# Patient Record
Sex: FEMALE | Race: WHITE | NOT HISPANIC OR LATINO | Employment: OTHER | URBAN - METROPOLITAN AREA
[De-identification: names, ages, dates, MRNs, and addresses within clinical notes are randomized per-mention and may not be internally consistent; named-entity substitution may affect disease eponyms.]

---

## 2017-11-27 ENCOUNTER — RECORDS - HEALTHEAST (OUTPATIENT)
Dept: LAB | Facility: CLINIC | Age: 72
End: 2017-11-27

## 2017-11-27 LAB
CHOLEST SERPL-MCNC: 190 MG/DL
FASTING STATUS PATIENT QL REPORTED: ABNORMAL
HDLC SERPL-MCNC: 46 MG/DL
LDLC SERPL CALC-MCNC: 104 MG/DL
TRIGL SERPL-MCNC: 201 MG/DL

## 2019-02-27 ENCOUNTER — RECORDS - HEALTHEAST (OUTPATIENT)
Dept: LAB | Facility: CLINIC | Age: 74
End: 2019-02-27

## 2019-02-27 LAB
ALBUMIN SERPL-MCNC: 3.7 G/DL (ref 3.5–5)
ALP SERPL-CCNC: 72 U/L (ref 45–120)
ALT SERPL W P-5'-P-CCNC: 34 U/L (ref 0–45)
ANION GAP SERPL CALCULATED.3IONS-SCNC: 8 MMOL/L (ref 5–18)
AST SERPL W P-5'-P-CCNC: 43 U/L (ref 0–40)
BILIRUB SERPL-MCNC: 0.3 MG/DL (ref 0–1)
BUN SERPL-MCNC: 13 MG/DL (ref 8–28)
CALCIUM SERPL-MCNC: 9.6 MG/DL (ref 8.5–10.5)
CHLORIDE BLD-SCNC: 101 MMOL/L (ref 98–107)
CHOLEST SERPL-MCNC: 146 MG/DL
CO2 SERPL-SCNC: 26 MMOL/L (ref 22–31)
CREAT SERPL-MCNC: 0.88 MG/DL (ref 0.6–1.1)
FASTING STATUS PATIENT QL REPORTED: ABNORMAL
GFR SERPL CREATININE-BSD FRML MDRD: >60 ML/MIN/1.73M2
GLUCOSE BLD-MCNC: 171 MG/DL (ref 70–125)
HDLC SERPL-MCNC: 42 MG/DL
LDLC SERPL CALC-MCNC: 89 MG/DL
POTASSIUM BLD-SCNC: 4.2 MMOL/L (ref 3.5–5)
PROT SERPL-MCNC: 6.6 G/DL (ref 6–8)
SODIUM SERPL-SCNC: 135 MMOL/L (ref 136–145)
TRIGL SERPL-MCNC: 76 MG/DL

## 2020-03-02 ENCOUNTER — RECORDS - HEALTHEAST (OUTPATIENT)
Dept: LAB | Facility: CLINIC | Age: 75
End: 2020-03-02

## 2020-03-02 LAB
ALBUMIN SERPL-MCNC: 4 G/DL (ref 3.5–5)
ALP SERPL-CCNC: 98 U/L (ref 45–120)
ALT SERPL W P-5'-P-CCNC: 19 U/L (ref 0–45)
ANION GAP SERPL CALCULATED.3IONS-SCNC: 7 MMOL/L (ref 5–18)
AST SERPL W P-5'-P-CCNC: 20 U/L (ref 0–40)
BILIRUB SERPL-MCNC: 0.3 MG/DL (ref 0–1)
BUN SERPL-MCNC: 14 MG/DL (ref 8–28)
CALCIUM SERPL-MCNC: 10.6 MG/DL (ref 8.5–10.5)
CHLORIDE BLD-SCNC: 103 MMOL/L (ref 98–107)
CHOLEST SERPL-MCNC: 197 MG/DL
CO2 SERPL-SCNC: 27 MMOL/L (ref 22–31)
CREAT SERPL-MCNC: 0.82 MG/DL (ref 0.6–1.1)
FASTING STATUS PATIENT QL REPORTED: ABNORMAL
GFR SERPL CREATININE-BSD FRML MDRD: >60 ML/MIN/1.73M2
GLUCOSE BLD-MCNC: 89 MG/DL (ref 70–125)
HDLC SERPL-MCNC: 51 MG/DL
LDLC SERPL CALC-MCNC: 97 MG/DL
POTASSIUM BLD-SCNC: 4.9 MMOL/L (ref 3.5–5)
PROT SERPL-MCNC: 7.1 G/DL (ref 6–8)
SODIUM SERPL-SCNC: 137 MMOL/L (ref 136–145)
TRIGL SERPL-MCNC: 245 MG/DL

## 2021-04-16 ENCOUNTER — RECORDS - HEALTHEAST (OUTPATIENT)
Dept: LAB | Facility: CLINIC | Age: 76
End: 2021-04-16

## 2021-04-16 LAB
CHOLEST SERPL-MCNC: 197 MG/DL
FASTING STATUS PATIENT QL REPORTED: ABNORMAL
HDLC SERPL-MCNC: 48 MG/DL
LDLC SERPL CALC-MCNC: 118 MG/DL
TRIGL SERPL-MCNC: 157 MG/DL

## 2021-09-21 ENCOUNTER — LAB REQUISITION (OUTPATIENT)
Dept: LAB | Facility: CLINIC | Age: 76
End: 2021-09-21
Payer: COMMERCIAL

## 2021-09-21 DIAGNOSIS — E11.9 TYPE 2 DIABETES MELLITUS WITHOUT COMPLICATIONS (H): ICD-10-CM

## 2021-09-21 DIAGNOSIS — I10 ESSENTIAL (PRIMARY) HYPERTENSION: ICD-10-CM

## 2021-09-21 DIAGNOSIS — R60.0 LOCALIZED EDEMA: ICD-10-CM

## 2021-09-21 LAB
ALBUMIN SERPL-MCNC: 4.2 G/DL (ref 3.5–5)
ALP SERPL-CCNC: 81 U/L (ref 45–120)
ALT SERPL W P-5'-P-CCNC: 19 U/L (ref 0–45)
ANION GAP SERPL CALCULATED.3IONS-SCNC: 10 MMOL/L (ref 5–18)
AST SERPL W P-5'-P-CCNC: 21 U/L (ref 0–40)
BILIRUB SERPL-MCNC: 0.4 MG/DL (ref 0–1)
BUN SERPL-MCNC: 15 MG/DL (ref 8–28)
CALCIUM SERPL-MCNC: 10.9 MG/DL (ref 8.5–10.5)
CHLORIDE BLD-SCNC: 102 MMOL/L (ref 98–107)
CO2 SERPL-SCNC: 27 MMOL/L (ref 22–31)
CREAT SERPL-MCNC: 0.84 MG/DL (ref 0.6–1.1)
CREAT UR-MCNC: 70 MG/DL
GFR SERPL CREATININE-BSD FRML MDRD: 68 ML/MIN/1.73M2
GLUCOSE BLD-MCNC: 84 MG/DL (ref 70–125)
MICROALBUMIN UR-MCNC: 13.72 MG/DL (ref 0–1.99)
MICROALBUMIN/CREAT UR: 196 MG/G CR
POTASSIUM BLD-SCNC: 4.4 MMOL/L (ref 3.5–5)
PROT SERPL-MCNC: 7.3 G/DL (ref 6–8)
SODIUM SERPL-SCNC: 139 MMOL/L (ref 136–145)
TSH SERPL DL<=0.005 MIU/L-ACNC: 0.96 UIU/ML (ref 0.3–5)

## 2021-09-21 PROCEDURE — 80053 COMPREHEN METABOLIC PANEL: CPT | Mod: ORL | Performed by: FAMILY MEDICINE

## 2021-09-21 PROCEDURE — 82043 UR ALBUMIN QUANTITATIVE: CPT | Mod: ORL | Performed by: FAMILY MEDICINE

## 2021-09-21 PROCEDURE — 84443 ASSAY THYROID STIM HORMONE: CPT | Mod: ORL | Performed by: FAMILY MEDICINE

## 2022-07-25 ENCOUNTER — LAB REQUISITION (OUTPATIENT)
Dept: LAB | Facility: CLINIC | Age: 77
End: 2022-07-25
Payer: COMMERCIAL

## 2022-07-25 DIAGNOSIS — R30.0 DYSURIA: ICD-10-CM

## 2022-07-25 PROCEDURE — 87086 URINE CULTURE/COLONY COUNT: CPT | Mod: ORL | Performed by: FAMILY MEDICINE

## 2022-07-27 LAB — BACTERIA UR CULT: NORMAL

## 2022-08-04 ENCOUNTER — LAB REQUISITION (OUTPATIENT)
Dept: LAB | Facility: CLINIC | Age: 77
End: 2022-08-04
Payer: COMMERCIAL

## 2022-08-04 DIAGNOSIS — N39.0 URINARY TRACT INFECTION, SITE NOT SPECIFIED: ICD-10-CM

## 2022-08-04 PROCEDURE — 87086 URINE CULTURE/COLONY COUNT: CPT | Mod: ORL | Performed by: FAMILY MEDICINE

## 2022-08-06 LAB — BACTERIA UR CULT: NORMAL

## 2022-11-04 ENCOUNTER — LAB REQUISITION (OUTPATIENT)
Dept: LAB | Facility: CLINIC | Age: 77
End: 2022-11-04
Payer: COMMERCIAL

## 2022-11-04 DIAGNOSIS — E78.5 HYPERLIPIDEMIA, UNSPECIFIED: ICD-10-CM

## 2022-11-04 DIAGNOSIS — S06.0X0A CONCUSSION WITHOUT LOSS OF CONSCIOUSNESS, INITIAL ENCOUNTER: ICD-10-CM

## 2022-11-04 DIAGNOSIS — I10 ESSENTIAL (PRIMARY) HYPERTENSION: ICD-10-CM

## 2022-11-04 DIAGNOSIS — E11.9 TYPE 2 DIABETES MELLITUS WITHOUT COMPLICATIONS (H): ICD-10-CM

## 2022-11-04 LAB
ALBUMIN SERPL BCG-MCNC: 4.6 G/DL (ref 3.5–5.2)
ALP SERPL-CCNC: 81 U/L (ref 35–104)
ALT SERPL W P-5'-P-CCNC: 23 U/L (ref 10–35)
ANION GAP SERPL CALCULATED.3IONS-SCNC: 14 MMOL/L (ref 7–15)
AST SERPL W P-5'-P-CCNC: 24 U/L (ref 10–35)
BILIRUB SERPL-MCNC: 0.3 MG/DL
BUN SERPL-MCNC: 13.3 MG/DL (ref 8–23)
CALCIUM SERPL-MCNC: 10.5 MG/DL (ref 8.8–10.2)
CHLORIDE SERPL-SCNC: 101 MMOL/L (ref 98–107)
CHOLEST SERPL-MCNC: 159 MG/DL
CREAT SERPL-MCNC: 0.87 MG/DL (ref 0.51–0.95)
CREAT UR-MCNC: 118 MG/DL
CREAT UR-MCNC: <1.1 MG/DL
DEPRECATED HCO3 PLAS-SCNC: 23 MMOL/L (ref 22–29)
GFR SERPL CREATININE-BSD FRML MDRD: 69 ML/MIN/1.73M2
GLUCOSE SERPL-MCNC: 114 MG/DL (ref 70–99)
HDLC SERPL-MCNC: 38 MG/DL
LDLC SERPL CALC-MCNC: 97 MG/DL
MICROALBUMIN UR-MCNC: 227 MG/L
MICROALBUMIN UR-MCNC: 27 MG/L
MICROALBUMIN/CREAT UR: 192.37 MG/G CR (ref 0–25)
MICROALBUMIN/CREAT UR: NORMAL MG/G{CREAT}
NONHDLC SERPL-MCNC: 121 MG/DL
POTASSIUM SERPL-SCNC: 4.4 MMOL/L (ref 3.4–5.3)
PROT SERPL-MCNC: 7.1 G/DL (ref 6.4–8.3)
SODIUM SERPL-SCNC: 138 MMOL/L (ref 136–145)
TRIGL SERPL-MCNC: 122 MG/DL

## 2022-11-04 PROCEDURE — 80061 LIPID PANEL: CPT | Mod: ORL | Performed by: FAMILY MEDICINE

## 2022-11-04 PROCEDURE — 82043 UR ALBUMIN QUANTITATIVE: CPT | Mod: ORL | Performed by: FAMILY MEDICINE

## 2022-11-04 PROCEDURE — 80053 COMPREHEN METABOLIC PANEL: CPT | Mod: ORL | Performed by: FAMILY MEDICINE

## 2022-12-20 ENCOUNTER — APPOINTMENT (OUTPATIENT)
Dept: CT IMAGING | Facility: HOSPITAL | Age: 77
End: 2022-12-20
Attending: EMERGENCY MEDICINE
Payer: COMMERCIAL

## 2022-12-20 ENCOUNTER — HOSPITAL ENCOUNTER (EMERGENCY)
Facility: HOSPITAL | Age: 77
Discharge: HOME | End: 2022-12-20
Attending: EMERGENCY MEDICINE | Admitting: EMERGENCY MEDICINE
Payer: COMMERCIAL

## 2022-12-20 VITALS
SYSTOLIC BLOOD PRESSURE: 121 MMHG | TEMPERATURE: 97.7 F | WEIGHT: 195 LBS | RESPIRATION RATE: 19 BRPM | HEART RATE: 51 BPM | DIASTOLIC BLOOD PRESSURE: 70 MMHG | OXYGEN SATURATION: 96 %

## 2022-12-20 DIAGNOSIS — R07.89 CHEST WALL PAIN: ICD-10-CM

## 2022-12-20 DIAGNOSIS — M54.6 ACUTE BILATERAL THORACIC BACK PAIN: ICD-10-CM

## 2022-12-20 LAB
ALBUMIN SERPL BCG-MCNC: 4.4 G/DL (ref 3.5–5.2)
ALP SERPL-CCNC: 92 U/L (ref 35–104)
ALT SERPL W P-5'-P-CCNC: 20 U/L (ref 10–35)
ANION GAP SERPL CALCULATED.3IONS-SCNC: 11 MMOL/L (ref 7–15)
AST SERPL W P-5'-P-CCNC: 28 U/L (ref 10–35)
BASOPHILS # BLD AUTO: 0 10E3/UL (ref 0–0.2)
BASOPHILS NFR BLD AUTO: 1 %
BILIRUB DIRECT SERPL-MCNC: <0.2 MG/DL (ref 0–0.3)
BILIRUB SERPL-MCNC: 0.2 MG/DL
BUN SERPL-MCNC: 17.7 MG/DL (ref 8–23)
CALCIUM SERPL-MCNC: 10.6 MG/DL (ref 8.8–10.2)
CHLORIDE SERPL-SCNC: 102 MMOL/L (ref 98–107)
CREAT SERPL-MCNC: 0.88 MG/DL (ref 0.51–0.95)
DEPRECATED HCO3 PLAS-SCNC: 26 MMOL/L (ref 22–29)
EOSINOPHIL # BLD AUTO: 0.1 10E3/UL (ref 0–0.7)
EOSINOPHIL NFR BLD AUTO: 2 %
ERYTHROCYTE [DISTWIDTH] IN BLOOD BY AUTOMATED COUNT: 12.5 % (ref 10–15)
GFR SERPL CREATININE-BSD FRML MDRD: 68 ML/MIN/1.73M2
GLUCOSE SERPL-MCNC: 111 MG/DL (ref 70–99)
HCT VFR BLD AUTO: 40.8 % (ref 35–47)
HGB BLD-MCNC: 13.6 G/DL (ref 11.7–15.7)
HOLD SPECIMEN: NORMAL
HOLD SPECIMEN: NORMAL
IMM GRANULOCYTES # BLD: 0 10E3/UL
IMM GRANULOCYTES NFR BLD: 0 %
LYMPHOCYTES # BLD AUTO: 2.7 10E3/UL (ref 0.8–5.3)
LYMPHOCYTES NFR BLD AUTO: 45 %
MAGNESIUM SERPL-MCNC: 2 MG/DL (ref 1.7–2.3)
MCH RBC QN AUTO: 31.9 PG (ref 26.5–33)
MCHC RBC AUTO-ENTMCNC: 33.3 G/DL (ref 31.5–36.5)
MCV RBC AUTO: 96 FL (ref 78–100)
MONOCYTES # BLD AUTO: 0.5 10E3/UL (ref 0–1.3)
MONOCYTES NFR BLD AUTO: 8 %
NEUTROPHILS # BLD AUTO: 2.7 10E3/UL (ref 1.6–8.3)
NEUTROPHILS NFR BLD AUTO: 44 %
NRBC # BLD AUTO: 0 10E3/UL
NRBC BLD AUTO-RTO: 0 /100
PLATELET # BLD AUTO: 203 10E3/UL (ref 150–450)
POTASSIUM SERPL-SCNC: 4.5 MMOL/L (ref 3.4–5.3)
PROT SERPL-MCNC: 7.4 G/DL (ref 6.4–8.3)
RBC # BLD AUTO: 4.26 10E6/UL (ref 3.8–5.2)
SODIUM SERPL-SCNC: 139 MMOL/L (ref 136–145)
TROPONIN T SERPL HS-MCNC: 18 NG/L
WBC # BLD AUTO: 6 10E3/UL (ref 4–11)

## 2022-12-20 PROCEDURE — 74174 CTA ABD&PLVS W/CONTRAST: CPT

## 2022-12-20 PROCEDURE — 84484 ASSAY OF TROPONIN QUANT: CPT | Performed by: EMERGENCY MEDICINE

## 2022-12-20 PROCEDURE — 82310 ASSAY OF CALCIUM: CPT | Performed by: EMERGENCY MEDICINE

## 2022-12-20 PROCEDURE — 83735 ASSAY OF MAGNESIUM: CPT | Performed by: EMERGENCY MEDICINE

## 2022-12-20 PROCEDURE — 36415 COLL VENOUS BLD VENIPUNCTURE: CPT | Performed by: EMERGENCY MEDICINE

## 2022-12-20 PROCEDURE — 93005 ELECTROCARDIOGRAM TRACING: CPT | Performed by: STUDENT IN AN ORGANIZED HEALTH CARE EDUCATION/TRAINING PROGRAM

## 2022-12-20 PROCEDURE — 85004 AUTOMATED DIFF WBC COUNT: CPT | Performed by: EMERGENCY MEDICINE

## 2022-12-20 PROCEDURE — 82248 BILIRUBIN DIRECT: CPT | Performed by: EMERGENCY MEDICINE

## 2022-12-20 PROCEDURE — 250N000011 HC RX IP 250 OP 636: Performed by: EMERGENCY MEDICINE

## 2022-12-20 PROCEDURE — 93005 ELECTROCARDIOGRAM TRACING: CPT | Performed by: EMERGENCY MEDICINE

## 2022-12-20 PROCEDURE — 71275 CT ANGIOGRAPHY CHEST: CPT

## 2022-12-20 PROCEDURE — 96374 THER/PROPH/DIAG INJ IV PUSH: CPT | Mod: 59

## 2022-12-20 PROCEDURE — 99285 EMERGENCY DEPT VISIT HI MDM: CPT | Mod: 25

## 2022-12-20 PROCEDURE — 96375 TX/PRO/DX INJ NEW DRUG ADDON: CPT | Mod: 59

## 2022-12-20 RX ORDER — IOPAMIDOL 755 MG/ML
100 INJECTION, SOLUTION INTRAVASCULAR ONCE
Status: COMPLETED | OUTPATIENT
Start: 2022-12-20 | End: 2022-12-20

## 2022-12-20 RX ORDER — MORPHINE SULFATE 2 MG/ML
2 INJECTION, SOLUTION INTRAMUSCULAR; INTRAVENOUS ONCE
Status: COMPLETED | OUTPATIENT
Start: 2022-12-20 | End: 2022-12-20

## 2022-12-20 RX ORDER — KETOROLAC TROMETHAMINE 15 MG/ML
15 INJECTION, SOLUTION INTRAMUSCULAR; INTRAVENOUS ONCE
Status: COMPLETED | OUTPATIENT
Start: 2022-12-20 | End: 2022-12-20

## 2022-12-20 RX ORDER — LIDOCAINE 50 MG/G
1 PATCH TOPICAL EVERY 24 HOURS
Qty: 10 PATCH | Refills: 0 | Status: SHIPPED | OUTPATIENT
Start: 2022-12-20 | End: 2022-12-30

## 2022-12-20 RX ADMIN — IOPAMIDOL 100 ML: 755 INJECTION, SOLUTION INTRAVENOUS at 21:26

## 2022-12-20 RX ADMIN — MORPHINE SULFATE 2 MG: 2 INJECTION, SOLUTION INTRAMUSCULAR; INTRAVENOUS at 20:24

## 2022-12-20 RX ADMIN — KETOROLAC TROMETHAMINE 15 MG: 15 INJECTION, SOLUTION INTRAMUSCULAR; INTRAVENOUS at 22:51

## 2022-12-20 ASSESSMENT — ENCOUNTER SYMPTOMS
BACK PAIN: 1
DYSURIA: 0
NAUSEA: 0
CONFUSION: 0
DIAPHORESIS: 0
ABDOMINAL PAIN: 0
FEVER: 0
JOINT SWELLING: 0
VOMITING: 0
CHILLS: 0
DIARRHEA: 0
DIZZINESS: 0
SORE THROAT: 0
HEMATURIA: 0
SHORTNESS OF BREATH: 1

## 2022-12-20 ASSESSMENT — ACTIVITIES OF DAILY LIVING (ADL): ADLS_ACUITY_SCORE: 35

## 2022-12-21 NOTE — ED TRIAGE NOTES
Upper left chest pain radiating to back started this morning.  Hypertropic cardiomyopathy diagnosed nov 16th.  Is to have MRI.       Triage Assessment     Row Name 12/20/22 1943       Triage Assessment (Adult)    Airway WDL WDL       Respiratory WDL    Respiratory WDL X  hard to take deep breath       Skin Circulation/Temperature WDL    Skin Circulation/Temperature WDL WDL       Cardiac WDL    Cardiac WDL X;chest pain       Chest Pain Assessment    Chest Pain Location anterior chest, left;upper back, left;upper back, right    Character sharp       Peripheral/Neurovascular WDL    Peripheral Neurovascular WDL WDL       Cognitive/Neuro/Behavioral WDL    Cognitive/Neuro/Behavioral WDL WDL

## 2022-12-21 NOTE — ED PROVIDER NOTES
Emergency Department Encounter     Evaluation Date & Time:   12/20/2022  7:48 PM    CHIEF COMPLAINT:  Chest Pain      Triage Note:  Upper left chest pain radiating to back started this morning.  Hypertropic cardiomyopathy diagnosed nov 16th.  Is to have MRI.               ED COURSE & MEDICAL DECISION MAKING:     ED Course as of 12/20/22 2331   Tue Dec 20, 2022   2051 hsTrop 18, less than gender reference range with atypical symptoms > 6 hours. Will not pursue ACS further.     2212 CTA chest/abd/pelvis all negative with no acute pathology including no PE or aortic pathology.   2225 Pt and family re-evaluated, updated, discussed reassuring results. Rx for lidoderm patches, tylenol/ibuprofen and outpatient follow up.         Pt presenting with ongoing left sided chest pain since waking up this morning around 0400.  Pt reports it was more mild, but since 1700 has been having b/l upper thoracic back pain, worse with breathing, movement and palpation.  This pain is similar to left chest pain.  No acute n/v or diaphoresis.  Denies dyspnea otherwise and no recent cough.  Pulses intact, but recently diagnosed with hypertrophic cardiomyopathy and has not yet been started on meds, awaiting outpatient MRI.  Will get labs, CTA chest to evaluate aorta.  No hypoxia, afebrile, nontoxic appearing. EKG non-ischemic.  Will reassess after labs, imaging.    8:05 PM I introduced myself to the patient, obtained patient history, performed a physical exam, and discussed plan for ED workup including potential diagnostic laboratory/imaging studies and interventions.  10:18 PM I rechecked the patient and updated them on laboratory and imaging results. We discussed the plan for discharge and the patient is agreeable. Reviewed supportive cares, symptomatic treatment, outpatient follow up, and reasons to return to the Emergency Department. Patient to be discharged by ED RN.     At the conclusion of the encounter I discussed the results of all  the tests and the disposition. The questions were answered. The patient or family acknowledged understanding and was agreeable with the care plan.      MEDICATIONS GIVEN IN THE EMERGENCY DEPARTMENT:  Medications   morphine (PF) injection 2 mg (2 mg Intravenous Given 12/20/22 2024)   iopamidol (ISOVUE-370) solution 100 mL (100 mLs Intravenous Given 12/20/22 2126)   ketorolac (TORADOL) injection 15 mg (15 mg Intravenous Given 12/20/22 2251)       NEW PRESCRIPTIONS STARTED AT TODAY'S ED VISIT:  Discharge Medication List as of 12/20/2022 10:48 PM      START taking these medications    Details   lidocaine (LIDODERM) 5 % patch Place 1 patch onto the skin every 24 hours for 10 days Apply to area of painDisp-10 patch, [id number]             HPI   The history is provided by the patient. No  was used.        Felecia Diana is a 77 year old female with a pertinent history of HTN and hypertrophic cardiomyopathy who presents to this ED by private vehicle with her son for evaluation of chest pain.    Per chart review, the patient was seen by [facility], Dr. Luke, in clinic on 11/16/22 after she had an abnormal echocardiogram consistent with hypertrophic obstructive cardiomyopathy. An echocardiogram was performed on 11/15/22 which showed hyperdynamic LV function and severe septal hypertrophy (2.3 cm) consistent with hypertrophic obstructive cardiomyopathy. Plan for 14 day event monitor, CMR, follow-up in 6-8 weeks to assess risk of sudden cardiac death and decide if defibrillator is indicated.    The patient reports she woke up this morning around 0400 with left sided chest pain. The pain was constant throughout the day. Around 1700 this evening she had onset of sharp, burning pain across her upper back. She rates the pain 6/10. The back pain ahs been constant since onset. She is unsure if the back pain is related to the chest pain or is separate. The back pain is worse with deep breathing and  movement. She tried resting on the couch, but did not have any improvement of her symptoms so she called her son to bring her to the ED. She has not had any associated nausea, vomiting, or diaphoresis. She has not noticed any new rashes or leg swelling. She has otherwise been feeling her normal self the last few days.    She reports she has had similar chest pain before, but the back pain is new. She has no known history of DVT or PE. She reports she was recently diagnosed with hypertrophic cardiomyopathy, follows with [facility] cardiology, and has a plan for MRI on 1/13/23.    REVIEW OF SYSTEMS:  Review of Systems   Constitutional: Negative for chills, diaphoresis and fever.   HENT: Negative for sore throat.    Eyes: Negative for visual disturbance.   Respiratory: Positive for shortness of breath.    Cardiovascular: Positive for chest pain. Negative for leg swelling.   Gastrointestinal: Negative for abdominal pain, diarrhea, nausea and vomiting.   Endocrine: Negative for polyuria.   Genitourinary: Negative for dysuria and hematuria.        - urinary changes   Musculoskeletal: Positive for back pain. Negative for joint swelling.   Skin: Negative for rash.   Neurological: Negative for dizziness.   Psychiatric/Behavioral: Negative for confusion.   All other systems reviewed and are negative.      Medical History   History reviewed. No pertinent past medical history.    History reviewed. No pertinent surgical history.    History reviewed. No pertinent family history.         lidocaine (LIDODERM) 5 % patch        Physical Exam     Vitals:  /70   Pulse 51   Temp 97.7  F (36.5  C) (Temporal)   Resp 19   Wt 88.5 kg (195 lb)   SpO2 96%     PHYSICAL EXAM:   Physical Exam  Vitals and nursing note reviewed.   Constitutional:       General: She is not in acute distress.     Appearance: Normal appearance.   HENT:      Head: Normocephalic and atraumatic.      Nose: Nose normal.      Mouth/Throat:      Mouth: Mucous  membranes are moist.   Eyes:      Pupils: Pupils are equal, round, and reactive to light.   Neck:      Vascular: No JVD.   Cardiovascular:      Rate and Rhythm: Normal rate and regular rhythm.      Pulses: Normal pulses.           Radial pulses are 2+ on the right side and 2+ on the left side.        Dorsalis pedis pulses are 2+ on the right side and 2+ on the left side.   Pulmonary:      Effort: Pulmonary effort is normal. No respiratory distress.      Breath sounds: Normal breath sounds.   Chest:      Comments: Focal tenderness to palpation of the left anterior chest wall, no overlying rash  Abdominal:      Palpations: Abdomen is soft.      Tenderness: There is no abdominal tenderness.   Musculoskeletal:      Cervical back: Full passive range of motion without pain and neck supple.      Comments: No calf tenderness or swelling b/l. Tenderness to palpation of the bilateral upper thoracic musculature, no overlying rash.   Skin:     General: Skin is warm.      Findings: No rash.   Neurological:      General: No focal deficit present.      Mental Status: She is alert. Mental status is at baseline.      Comments: Fluent speech, no acute lateralizing deficits   Psychiatric:         Mood and Affect: Mood normal.         Behavior: Behavior normal.         Results     LAB:  All pertinent labs reviewed and interpreted  Labs Ordered and Resulted from Time of ED Arrival to Time of ED Departure   BASIC METABOLIC PANEL - Abnormal       Result Value    Sodium 139      Potassium 4.5      Chloride 102      Carbon Dioxide (CO2) 26      Anion Gap 11      Urea Nitrogen 17.7      Creatinine 0.88      Calcium 10.6 (*)     Glucose 111 (*)     GFR Estimate 68     TROPONIN T, HIGH SENSITIVITY - Abnormal    Troponin T, High Sensitivity 18 (*)    MAGNESIUM - Normal    Magnesium 2.0     HEPATIC FUNCTION PANEL - Normal    Protein Total 7.4      Albumin 4.4      Bilirubin Total 0.2      Alkaline Phosphatase 92      AST 28      ALT 20       Bilirubin Direct <0.20     CBC WITH PLATELETS AND DIFFERENTIAL    WBC Count 6.0      RBC Count 4.26      Hemoglobin 13.6      Hematocrit 40.8      MCV 96      MCH 31.9      MCHC 33.3      RDW 12.5      Platelet Count 203      % Neutrophils 44      % Lymphocytes 45      % Monocytes 8      % Eosinophils 2      % Basophils 1      % Immature Granulocytes 0      NRBCs per 100 WBC 0      Absolute Neutrophils 2.7      Absolute Lymphocytes 2.7      Absolute Monocytes 0.5      Absolute Eosinophils 0.1      Absolute Basophils 0.0      Absolute Immature Granulocytes 0.0      Absolute NRBCs 0.0         RADIOLOGY:  CTA Chest Abdomen Pelvis w Contrast   Final Result   IMPRESSION:   1.  No aortic aneurysm, dissection or hematoma to explain symptoms.   2.  No pulmonary emboli.   3.  There is a small hiatal hernia.   4.  Slight narrowing at the origin of the SMA and celiac arteries with poststenotic dilatation of the celiac.   5.  Distal colonic diverticulosis without evidence of diverticulitis. No evidence of diverticulitis.                      ECG:  NSR, rate 75, T wave inversions V2, no acute ST elevation, no priors for comparison    I have independently reviewed and interpreted the EKG(s) documented above     PROCEDURES:  Procedures:  none      FINAL IMPRESSION:    ICD-10-CM    1. Acute bilateral thoracic back pain  M54.6       2. Chest wall pain  R07.89           0 minutes of critical care time      I, Grzegorz Palencia, am serving as a scribe to document services personally performed by Dr. Terrence Gresham, based on my observations and the provider's statements to me. I, Terrence Gresham, DO attest that Grzegorz Palencia is acting in a scribe capacity, has observed my performance of the services and has documented them in accordance with my direction.      Terrence Gresham DO  Emergency Medicine  [facility] EMERGENCY DEPARTMENT  12/20/2022  8:12 PM          Terrence Gresham MD  12/20/22 1802

## 2022-12-21 NOTE — DISCHARGE INSTRUCTIONS
Follow up closely with primary clinic. Take tylenol and ibuprofen for pain, use lidoderm patches as directed. As we discussed, CT imaging, labs and EKG all reassuring.  Return for new emergency concerns.

## 2023-02-10 ENCOUNTER — LAB REQUISITION (OUTPATIENT)
Dept: LAB | Facility: CLINIC | Age: 78
End: 2023-02-10
Payer: COMMERCIAL

## 2023-02-10 DIAGNOSIS — R30.0 DYSURIA: ICD-10-CM

## 2023-02-10 PROCEDURE — 87086 URINE CULTURE/COLONY COUNT: CPT | Mod: ORL | Performed by: PHYSICIAN ASSISTANT

## 2023-02-12 LAB — BACTERIA UR CULT: NORMAL

## 2023-05-17 ENCOUNTER — LAB REQUISITION (OUTPATIENT)
Dept: LAB | Facility: CLINIC | Age: 78
End: 2023-05-17
Payer: COMMERCIAL

## 2023-05-17 DIAGNOSIS — R35.0 FREQUENCY OF MICTURITION: ICD-10-CM

## 2023-05-17 PROCEDURE — 87086 URINE CULTURE/COLONY COUNT: CPT | Mod: ORL | Performed by: PHYSICIAN ASSISTANT

## 2023-05-19 LAB — BACTERIA UR CULT: NORMAL

## 2023-10-12 RX ORDER — PRAVASTATIN SODIUM 40 MG
40 TABLET ORAL AT BEDTIME
COMMUNITY

## 2023-10-12 RX ORDER — SPIRONOLACTONE 25 MG/1
25 TABLET ORAL DAILY
Status: ON HOLD | COMMUNITY
End: 2023-12-20

## 2023-10-12 RX ORDER — ATENOLOL 50 MG/1
50 TABLET ORAL AT BEDTIME
COMMUNITY

## 2023-11-28 ENCOUNTER — TRANSFERRED RECORDS (OUTPATIENT)
Dept: MULTI SPECIALTY CLINIC | Facility: CLINIC | Age: 78
End: 2023-11-28

## 2023-11-28 ENCOUNTER — LAB REQUISITION (OUTPATIENT)
Dept: LAB | Facility: CLINIC | Age: 78
End: 2023-11-28
Payer: COMMERCIAL

## 2023-11-28 DIAGNOSIS — I10 ESSENTIAL (PRIMARY) HYPERTENSION: ICD-10-CM

## 2023-11-28 DIAGNOSIS — Z01.818 ENCOUNTER FOR OTHER PREPROCEDURAL EXAMINATION: ICD-10-CM

## 2023-11-28 DIAGNOSIS — E11.9 TYPE 2 DIABETES MELLITUS WITHOUT COMPLICATIONS (H): ICD-10-CM

## 2023-11-28 LAB
ANION GAP SERPL CALCULATED.3IONS-SCNC: 12 MMOL/L (ref 7–15)
BUN SERPL-MCNC: 15.1 MG/DL (ref 8–23)
CALCIUM SERPL-MCNC: 10.8 MG/DL (ref 8.8–10.2)
CHLORIDE SERPL-SCNC: 102 MMOL/L (ref 98–107)
CREAT SERPL-MCNC: 0.79 MG/DL (ref 0.51–0.95)
DEPRECATED HCO3 PLAS-SCNC: 24 MMOL/L (ref 22–29)
EGFRCR SERPLBLD CKD-EPI 2021: 77 ML/MIN/1.73M2
ERYTHROCYTE [DISTWIDTH] IN BLOOD BY AUTOMATED COUNT: 12.4 % (ref 10–15)
GLUCOSE SERPL-MCNC: 99 MG/DL (ref 70–99)
HBA1C MFR BLD: 6.2 % (ref 4.2–6.1)
HCT VFR BLD AUTO: 39.3 % (ref 35–47)
HGB BLD-MCNC: 13 G/DL (ref 11.7–15.7)
MCH RBC QN AUTO: 32.3 PG (ref 26.5–33)
MCHC RBC AUTO-ENTMCNC: 33.1 G/DL (ref 31.5–36.5)
MCV RBC AUTO: 98 FL (ref 78–100)
PLATELET # BLD AUTO: 203 10E3/UL (ref 150–450)
POTASSIUM SERPL-SCNC: 4.3 MMOL/L (ref 3.4–5.3)
RBC # BLD AUTO: 4.03 10E6/UL (ref 3.8–5.2)
SODIUM SERPL-SCNC: 138 MMOL/L (ref 135–145)
WBC # BLD AUTO: 6.4 10E3/UL (ref 4–11)

## 2023-11-28 PROCEDURE — 85027 COMPLETE CBC AUTOMATED: CPT | Mod: ORL | Performed by: FAMILY MEDICINE

## 2023-11-28 PROCEDURE — 80048 BASIC METABOLIC PNL TOTAL CA: CPT | Mod: ORL | Performed by: FAMILY MEDICINE

## 2023-12-06 NOTE — PROGRESS NOTES
Discharge plan according to [facility]:       10/12/23 1606   Discharge Planning   Patient/Family Anticipates Transition to home   Living Arrangements   People in Home alone   Is your private residence a single family home or apartment? Apartment   Once home, are you able to live on one level? Yes   Bathroom Shower/Tub Tub/Shower unit   Equipment Currently Used at Home grab bar, tub/shower   Support System   Support Systems Children   Do you have someone available to stay with you one or two nights once you are home? Yes  (son and/or daughter able to stay after surgery)

## 2023-12-19 ENCOUNTER — ANESTHESIA EVENT (OUTPATIENT)
Dept: SURGERY | Facility: CLINIC | Age: 78
End: 2023-12-19
Payer: COMMERCIAL

## 2023-12-19 NOTE — TREATMENT PLAN
"Orthopedic Surgery Pre-Op Plan: Felecia Diana  pre-op review. This is NOT an H&P   Surgeon: Dr. Alexander    [facility]: [facility]  Name of Surgery: Left Direct Anterior Total Hip Arthroplasty  Date of Surgery: 12/20/23  H&P: Completed on 11/28/23 by Dr. Jah Angulo at \[facility]\"".   History of ASA, NSAIDS, vitamin and/or herbal supplements, GLP-1 Agonist medication taken within 10 days?: No  History of blood thinners?: No    Plan:   1) Discharge Plan: Home morning of POD 1 with assist of Family (Son and Daughter). Please see Discharge Planning section near bottom of this note for further details.     2) Hypertrophic Cardiomyopathy: Follows with Cardiology at [facility]. Reviewed recent Cardiology notes from Parul Ramey NP and Dr. Luke from 10/2023: patient had echocardiogram on 11/15/2022 which showed hyperdynamic LV function and severe septal hypertrophy consistent with hypertrophic cardiomyopathy.  There was no left ventricular outflow tract obstruction at rest.  Cardiac MRI 1/13/2023 was also consistent with hypertrophic cardiomyopathy without obstruction and no LGE. There was consideration for possible ICD placement but since patient did not have any high risk features for sudden cardiac death, it was felt she was not a candidate for ICD.  At most recent Cardiology visit 10/16/23: patient reported feeling well from cardiac perspective. Denied chest pain, shortness of breath, palpitations, heart racing, dizziness, lightheadedness or lower extremity swelling.  Reports weights have been stable.  She does have some exertional dyspnea but this is not new. Cardiology cleared patient for planned left hip surgery at low risk for cardiovascular event. Her primary Cardiologist, Dr. Ricki Luke, did recommend the following, \" I think she can go ahead with hip replacement. One important point for anesthesiologists is if she has any hypotension during surgery giving inotropes is usually not a good approach as it " "can make LEFT VENTRICULAR OUTFLOW TRACT or mid ventricular gradients worse. Better approach is giving vasoconstrictors.\"     Echocardiogram 11/15/22:   Final Conclusion   1. Severe septal hypertrophy (2.3cm).  There is probably also apical lateral hypertrophy (Not  well seen).   2. No left ventricular outflow tract obstruction at rest.   3. Hyperdynamic left ventricular systolic function estimated left ventricular ejection  fraction is 65-70%.   4. No regional wall motion abnormalities.   5. Normal right ventricular chamber size. Normal right ventricular systolic function.   6. No significant valvular heart disease.     There were no prior studies available for comparison.   This study was originally intended to be a stress echocardiogram.  After the resting  echocardiogram showed left ventricular   hypertrophy suggestive of hypertrophic cardiomyopathy, the stress portion was cancelled. The  patient was scheduled for outpatient   cardiology consult.  Cardiac MR is recommended.   Estimated EF: 65-70%    Cardiac MRI 1/13/23:   1. Severe asymmetric hypertrophy involving the basal anteroseptum, and mid anterior, anteroseptal and  inferoseptal segments. Most hypertrophied segment is the basal anteroseptal segment measuring 19mm. Subtle  MRI evidence of LVOT  obstruction.  2. LV cavity size is normal. LV systolic function is normal. Quantitative LVEF 61 %.  3. No definite hyperenhancement is appreciated.  4. RV cavity size is normal. RV systolic function is normal. Quantitative RVEF 72 %.  5. Mitral valve leaflets are normal. No systolic anterior motion of the mitral valve.    3) Hyperlipidemia: On pravastatin.    4) Hypertension: Well-controlled on atenolol and spironolactone.  Patient instructed to hold spironolactone on the morning of surgery but to continue taking atenolol.    5) Asthma: Uncomplicated: Denies any recent exacerbations.  Does not appear to be on any medications for this.    6) Type 2 Diabetes Mellitus: " Good control.  Most recent hemoglobin A1c 6.2 on 11/28/2023.  Blood glucose 99 on 11/28/2023.  Not on any medications for diabetes at this time. I recommend blood glucose checks at least three times a day and at bedtime during hospital stay. Goal BG < 180 to decrease risk for infection and wound healing complications. Nursing to please notify Hospitalist if BG > 180.      7) Morbid Obesity: BMI 40, Wt: 188 lbs. I recommend continued efforts at safe weight loss following recovery from surgery. If patient is interested in further assistance with weight loss, please consider referral to [facility]. They offer both non-surgical and surgical evidence-based weight loss strategies. Call [phone] to schedule a consultation to learn more.      Patient appears medically optimized for upcoming surgery. I would recommend Hospitalist Consult to assist with medical management. Please call me below with any questions on this patient.       Review of Systems Notable for: Hypertrophic cardiomyopathy-follows with cardiology-cleared for surgery at recent visit 10/20/2023, hyperlipidemia, hypertension, asthma-uncomplicated, type 2 diabetes mellitus-good control, morbid obesity.    Past Medical History:   Past Medical History:   Diagnosis Date    Arthritis     Diabetes (H)     Heart murmur     Hypertension     Recurrent UTI     Syncope     Uncomplicated asthma      History reviewed. No pertinent surgical history.    Current Medications:  Patient's Medications   New Prescriptions    No medications on file   Previous Medications    ATENOLOL (TENORMIN) 50 MG TABLET    Take 50 mg by mouth daily    CHOLECALCIFEROL (VITAMIN D-3) 125 MCG (5000 UT) TABS    Take 1 tablet by mouth at bedtime    PRAVASTATIN (PRAVACHOL) 40 MG TABLET    Take 40 mg by mouth daily    SPIRONOLACTONE (ALDACTONE) 25 MG TABLET    Take 25 mg by mouth daily   Modified Medications    No medications on file   Discontinued  Medications    No medications on file       ALLERGIES:  Allergies   Allergen Reactions    Aspirin Other (See Comments) and Unknown     ulcers    Lisinopril Unknown     Left arm weakness (missed 2 weeks of work), rash      Sulfamethoxazole-Trimethoprim Unknown       Social History  Social History     Tobacco Use    Smoking status: Never    Smokeless tobacco: Never   Substance Use Topics    Alcohol use: Yes     Comment: rare    Drug use: Never       Any Abnormal Recent Diagnostics? Yes  Hemoglobin A1c 6.2 on 11/28/2023: Shows good recent control of type 2 diabetes without medications.  Blood glucose 99 on 11/28/2023: We will monitor BG's closely during hospital stay.  Goal BG <180.    Discharge Planning:   Discharge plan according to [facility]:     Home morning of POD 1 with assist of Family (Son and Daughter).     10/12/23 1606   Discharge Planning   Patient/Family Anticipates Transition to home   Living Arrangements   People in Home alone   Is your private residence a single family home or apartment? Apartment   Once home, are you able to live on one level? Yes   Bathroom Shower/Tub Tub/Shower unit   Equipment Currently Used at Home grab bar, tub/shower   Support System   Support Systems Children   Do you have someone available to stay with you one or two nights once you are home? Yes  (son and/or daughter able to stay after surgery)       LATISHA Whelan, CNP   Advanced Practice Nurse Navigator- Orthopedics  [facility]   Phone: [phone]

## 2023-12-20 ENCOUNTER — HOSPITAL ENCOUNTER (OUTPATIENT)
Facility: CLINIC | Age: 78
Discharge: HOME | End: 2023-12-21
Attending: ORTHOPAEDIC SURGERY | Admitting: ORTHOPAEDIC SURGERY
Payer: COMMERCIAL

## 2023-12-20 ENCOUNTER — ANESTHESIA (OUTPATIENT)
Dept: SURGERY | Facility: CLINIC | Age: 78
End: 2023-12-20
Payer: COMMERCIAL

## 2023-12-20 ENCOUNTER — APPOINTMENT (OUTPATIENT)
Dept: RADIOLOGY | Facility: CLINIC | Age: 78
End: 2023-12-20
Attending: PHYSICIAN ASSISTANT
Payer: COMMERCIAL

## 2023-12-20 ENCOUNTER — APPOINTMENT (OUTPATIENT)
Dept: PHYSICAL THERAPY | Facility: CLINIC | Age: 78
End: 2023-12-20
Attending: ORTHOPAEDIC SURGERY
Payer: COMMERCIAL

## 2023-12-20 DIAGNOSIS — M16.12 PRIMARY OSTEOARTHRITIS OF LEFT HIP: Primary | ICD-10-CM

## 2023-12-20 PROBLEM — Z47.1 ORTHOPEDIC AFTERCARE FOR JOINT REPLACEMENT: Status: ACTIVE | Noted: 2023-12-20

## 2023-12-20 LAB
GLUCOSE BLDC GLUCOMTR-MCNC: 100 MG/DL (ref 70–99)
GLUCOSE BLDC GLUCOMTR-MCNC: 153 MG/DL (ref 70–99)
GLUCOSE BLDC GLUCOMTR-MCNC: 161 MG/DL (ref 70–99)

## 2023-12-20 PROCEDURE — 258N000003 HC RX IP 258 OP 636: Performed by: PHYSICIAN ASSISTANT

## 2023-12-20 PROCEDURE — 999N000065 XR PELVIS AND HIP PORTABLE LEFT 1 VIEW

## 2023-12-20 PROCEDURE — 82962 GLUCOSE BLOOD TEST: CPT

## 2023-12-20 PROCEDURE — 258N000003 HC RX IP 258 OP 636: Performed by: NURSE ANESTHETIST, CERTIFIED REGISTERED

## 2023-12-20 PROCEDURE — 710N000010 HC RECOVERY PHASE 1, LEVEL 2, PER MIN: Performed by: ORTHOPAEDIC SURGERY

## 2023-12-20 PROCEDURE — 272N000001 HC OR GENERAL SUPPLY STERILE: Performed by: ORTHOPAEDIC SURGERY

## 2023-12-20 PROCEDURE — 97161 PT EVAL LOW COMPLEX 20 MIN: CPT | Mod: GP

## 2023-12-20 PROCEDURE — 250N000013 HC RX MED GY IP 250 OP 250 PS 637: Performed by: FAMILY MEDICINE

## 2023-12-20 PROCEDURE — 360N000077 HC SURGERY LEVEL 4, PER MIN: Performed by: ORTHOPAEDIC SURGERY

## 2023-12-20 PROCEDURE — 97110 THERAPEUTIC EXERCISES: CPT | Mod: GP

## 2023-12-20 PROCEDURE — C1776 JOINT DEVICE (IMPLANTABLE): HCPCS | Performed by: ORTHOPAEDIC SURGERY

## 2023-12-20 PROCEDURE — 250N000009 HC RX 250: Performed by: PHYSICIAN ASSISTANT

## 2023-12-20 PROCEDURE — 258N000003 HC RX IP 258 OP 636: Performed by: ANESTHESIOLOGY

## 2023-12-20 PROCEDURE — 370N000017 HC ANESTHESIA TECHNICAL FEE, PER MIN: Performed by: ORTHOPAEDIC SURGERY

## 2023-12-20 PROCEDURE — 250N000009 HC RX 250: Performed by: NURSE ANESTHETIST, CERTIFIED REGISTERED

## 2023-12-20 PROCEDURE — 250N000011 HC RX IP 250 OP 636: Mod: JZ | Performed by: PHYSICIAN ASSISTANT

## 2023-12-20 PROCEDURE — 250N000011 HC RX IP 250 OP 636: Mod: JZ | Performed by: ANESTHESIOLOGY

## 2023-12-20 PROCEDURE — 97116 GAIT TRAINING THERAPY: CPT | Mod: GP

## 2023-12-20 PROCEDURE — 99214 OFFICE O/P EST MOD 30 MIN: CPT | Performed by: FAMILY MEDICINE

## 2023-12-20 PROCEDURE — 250N000013 HC RX MED GY IP 250 OP 250 PS 637: Performed by: PHYSICIAN ASSISTANT

## 2023-12-20 PROCEDURE — 250N000011 HC RX IP 250 OP 636: Performed by: PHYSICIAN ASSISTANT

## 2023-12-20 PROCEDURE — 999N000141 HC STATISTIC PRE-PROCEDURE NURSING ASSESSMENT: Performed by: ORTHOPAEDIC SURGERY

## 2023-12-20 PROCEDURE — 250N000025 HC SEVOFLURANE, PER MIN: Performed by: ORTHOPAEDIC SURGERY

## 2023-12-20 PROCEDURE — 250N000011 HC RX IP 250 OP 636: Performed by: NURSE ANESTHETIST, CERTIFIED REGISTERED

## 2023-12-20 DEVICE — HIP STEM - HIGH OFFSET
Type: IMPLANTABLE DEVICE | Site: HIP | Status: FUNCTIONAL
Brand: INSIGNIA

## 2023-12-20 DEVICE — TRIDENT II TRITANIUM SOLIDBACK ACETABULAR SHELL 46C
Type: IMPLANTABLE DEVICE | Site: HIP | Status: FUNCTIONAL
Brand: TRIDENT II

## 2023-12-20 DEVICE — CERAMIC V40 FEMORAL HEAD
Type: IMPLANTABLE DEVICE | Site: HIP | Status: FUNCTIONAL
Brand: BIOLOX

## 2023-12-20 DEVICE — TRIDENT X3 0 DEGREE POLYETHYLENE INSERT
Type: IMPLANTABLE DEVICE | Site: HIP | Status: FUNCTIONAL
Brand: TRIDENT X3 INSERT

## 2023-12-20 RX ORDER — HYDROMORPHONE HCL IN WATER/PF 6 MG/30 ML
0.2 PATIENT CONTROLLED ANALGESIA SYRINGE INTRAVENOUS
Status: DISCONTINUED | OUTPATIENT
Start: 2023-12-20 | End: 2023-12-21 | Stop reason: HOSPADM

## 2023-12-20 RX ORDER — ATENOLOL 50 MG/1
50 TABLET ORAL AT BEDTIME
Status: DISCONTINUED | OUTPATIENT
Start: 2023-12-20 | End: 2023-12-21 | Stop reason: HOSPADM

## 2023-12-20 RX ORDER — OXYCODONE HYDROCHLORIDE 5 MG/1
5 TABLET ORAL EVERY 4 HOURS PRN
Status: DISCONTINUED | OUTPATIENT
Start: 2023-12-20 | End: 2023-12-21 | Stop reason: HOSPADM

## 2023-12-20 RX ORDER — CEFAZOLIN SODIUM/WATER 2 G/20 ML
2 SYRINGE (ML) INTRAVENOUS SEE ADMIN INSTRUCTIONS
Status: DISCONTINUED | OUTPATIENT
Start: 2023-12-20 | End: 2023-12-20 | Stop reason: HOSPADM

## 2023-12-20 RX ORDER — ACETAMINOPHEN 325 MG/1
650 TABLET ORAL EVERY 4 HOURS PRN
Status: DISCONTINUED | OUTPATIENT
Start: 2023-12-23 | End: 2023-12-21 | Stop reason: HOSPADM

## 2023-12-20 RX ORDER — OXYCODONE HYDROCHLORIDE 5 MG/1
10 TABLET ORAL EVERY 4 HOURS PRN
Status: DISCONTINUED | OUTPATIENT
Start: 2023-12-20 | End: 2023-12-21 | Stop reason: HOSPADM

## 2023-12-20 RX ORDER — LIDOCAINE HYDROCHLORIDE 10 MG/ML
INJECTION, SOLUTION INFILTRATION; PERINEURAL PRN
Status: DISCONTINUED | OUTPATIENT
Start: 2023-12-20 | End: 2023-12-20

## 2023-12-20 RX ORDER — NALOXONE HYDROCHLORIDE 0.4 MG/ML
0.4 INJECTION, SOLUTION INTRAMUSCULAR; INTRAVENOUS; SUBCUTANEOUS
Status: DISCONTINUED | OUTPATIENT
Start: 2023-12-20 | End: 2023-12-21 | Stop reason: HOSPADM

## 2023-12-20 RX ORDER — HYDROMORPHONE HCL IN WATER/PF 6 MG/30 ML
0.4 PATIENT CONTROLLED ANALGESIA SYRINGE INTRAVENOUS EVERY 5 MIN PRN
Status: DISCONTINUED | OUTPATIENT
Start: 2023-12-20 | End: 2023-12-20

## 2023-12-20 RX ORDER — CEFAZOLIN SODIUM 2 G/100ML
2 INJECTION, SOLUTION INTRAVENOUS EVERY 8 HOURS
Qty: 200 ML | Refills: 0 | Status: COMPLETED | OUTPATIENT
Start: 2023-12-20 | End: 2023-12-20

## 2023-12-20 RX ORDER — ACETAMINOPHEN 325 MG/1
975 TABLET ORAL ONCE
Status: COMPLETED | OUTPATIENT
Start: 2023-12-20 | End: 2023-12-20

## 2023-12-20 RX ORDER — AMOXICILLIN 250 MG
1 CAPSULE ORAL 2 TIMES DAILY
Status: DISCONTINUED | OUTPATIENT
Start: 2023-12-20 | End: 2023-12-21 | Stop reason: HOSPADM

## 2023-12-20 RX ORDER — ASPIRIN 81 MG/1
81 TABLET ORAL 2 TIMES DAILY
Qty: 60 TABLET | Refills: 0 | Status: SHIPPED | OUTPATIENT
Start: 2023-12-20

## 2023-12-20 RX ORDER — ACETAMINOPHEN 325 MG/1
975 TABLET ORAL EVERY 8 HOURS
Qty: 27 TABLET | Refills: 0 | Status: DISCONTINUED | OUTPATIENT
Start: 2023-12-20 | End: 2023-12-21 | Stop reason: HOSPADM

## 2023-12-20 RX ORDER — HYDROXYZINE HYDROCHLORIDE 10 MG/1
10 TABLET, FILM COATED ORAL EVERY 6 HOURS PRN
Qty: 30 TABLET | Refills: 0 | Status: SHIPPED | OUTPATIENT
Start: 2023-12-20

## 2023-12-20 RX ORDER — LABETALOL HYDROCHLORIDE 5 MG/ML
INJECTION, SOLUTION INTRAVENOUS PRN
Status: DISCONTINUED | OUTPATIENT
Start: 2023-12-20 | End: 2023-12-20

## 2023-12-20 RX ORDER — PROCHLORPERAZINE MALEATE 5 MG
5 TABLET ORAL EVERY 6 HOURS PRN
Status: DISCONTINUED | OUTPATIENT
Start: 2023-12-20 | End: 2023-12-21 | Stop reason: HOSPADM

## 2023-12-20 RX ORDER — PROPOFOL 10 MG/ML
INJECTION, EMULSION INTRAVENOUS PRN
Status: DISCONTINUED | OUTPATIENT
Start: 2023-12-20 | End: 2023-12-20

## 2023-12-20 RX ORDER — ONDANSETRON 2 MG/ML
INJECTION INTRAMUSCULAR; INTRAVENOUS PRN
Status: DISCONTINUED | OUTPATIENT
Start: 2023-12-20 | End: 2023-12-20

## 2023-12-20 RX ORDER — LABETALOL HYDROCHLORIDE 5 MG/ML
10 INJECTION, SOLUTION INTRAVENOUS ONCE
Status: COMPLETED | OUTPATIENT
Start: 2023-12-20 | End: 2023-12-20

## 2023-12-20 RX ORDER — LIDOCAINE 40 MG/G
CREAM TOPICAL
Status: DISCONTINUED | OUTPATIENT
Start: 2023-12-20 | End: 2023-12-21 | Stop reason: HOSPADM

## 2023-12-20 RX ORDER — PRAVASTATIN SODIUM 20 MG
40 TABLET ORAL AT BEDTIME
Status: DISCONTINUED | OUTPATIENT
Start: 2023-12-20 | End: 2023-12-21 | Stop reason: HOSPADM

## 2023-12-20 RX ORDER — ACETAMINOPHEN 325 MG/1
650 TABLET ORAL EVERY 4 HOURS PRN
Qty: 100 TABLET | Refills: 0 | Status: SHIPPED | OUTPATIENT
Start: 2023-12-20

## 2023-12-20 RX ORDER — OXYCODONE HYDROCHLORIDE 5 MG/1
5-10 TABLET ORAL EVERY 4 HOURS PRN
Qty: 20 TABLET | Refills: 0 | Status: SHIPPED | OUTPATIENT
Start: 2023-12-20

## 2023-12-20 RX ORDER — ONDANSETRON 4 MG/1
4 TABLET, ORALLY DISINTEGRATING ORAL EVERY 30 MIN PRN
Status: DISCONTINUED | OUTPATIENT
Start: 2023-12-20 | End: 2023-12-20 | Stop reason: HOSPADM

## 2023-12-20 RX ORDER — ONDANSETRON 2 MG/ML
4 INJECTION INTRAMUSCULAR; INTRAVENOUS EVERY 6 HOURS PRN
Status: DISCONTINUED | OUTPATIENT
Start: 2023-12-20 | End: 2023-12-21 | Stop reason: HOSPADM

## 2023-12-20 RX ORDER — POLYETHYLENE GLYCOL 3350 17 G/17G
17 POWDER, FOR SOLUTION ORAL DAILY
Status: DISCONTINUED | OUTPATIENT
Start: 2023-12-21 | End: 2023-12-21 | Stop reason: HOSPADM

## 2023-12-20 RX ORDER — DEXAMETHASONE SODIUM PHOSPHATE 10 MG/ML
INJECTION, SOLUTION INTRAMUSCULAR; INTRAVENOUS PRN
Status: DISCONTINUED | OUTPATIENT
Start: 2023-12-20 | End: 2023-12-20

## 2023-12-20 RX ORDER — HYDROMORPHONE HCL IN WATER/PF 6 MG/30 ML
0.4 PATIENT CONTROLLED ANALGESIA SYRINGE INTRAVENOUS
Status: DISCONTINUED | OUTPATIENT
Start: 2023-12-20 | End: 2023-12-21 | Stop reason: HOSPADM

## 2023-12-20 RX ORDER — NALOXONE HYDROCHLORIDE 0.4 MG/ML
0.2 INJECTION, SOLUTION INTRAMUSCULAR; INTRAVENOUS; SUBCUTANEOUS
Status: DISCONTINUED | OUTPATIENT
Start: 2023-12-20 | End: 2023-12-21 | Stop reason: HOSPADM

## 2023-12-20 RX ORDER — ONDANSETRON 2 MG/ML
4 INJECTION INTRAMUSCULAR; INTRAVENOUS EVERY 30 MIN PRN
Status: DISCONTINUED | OUTPATIENT
Start: 2023-12-20 | End: 2023-12-20 | Stop reason: HOSPADM

## 2023-12-20 RX ORDER — SODIUM CHLORIDE, SODIUM LACTATE, POTASSIUM CHLORIDE, CALCIUM CHLORIDE 600; 310; 30; 20 MG/100ML; MG/100ML; MG/100ML; MG/100ML
INJECTION, SOLUTION INTRAVENOUS CONTINUOUS
Status: DISCONTINUED | OUTPATIENT
Start: 2023-12-20 | End: 2023-12-20 | Stop reason: HOSPADM

## 2023-12-20 RX ORDER — SODIUM CHLORIDE, SODIUM LACTATE, POTASSIUM CHLORIDE, CALCIUM CHLORIDE 600; 310; 30; 20 MG/100ML; MG/100ML; MG/100ML; MG/100ML
INJECTION, SOLUTION INTRAVENOUS CONTINUOUS
Status: DISCONTINUED | OUTPATIENT
Start: 2023-12-20 | End: 2023-12-21 | Stop reason: HOSPADM

## 2023-12-20 RX ORDER — TRANEXAMIC ACID 650 MG/1
1950 TABLET ORAL ONCE
Status: COMPLETED | OUTPATIENT
Start: 2023-12-20 | End: 2023-12-20

## 2023-12-20 RX ORDER — CEFADROXIL 500 MG/1
500 CAPSULE ORAL 2 TIMES DAILY
Qty: 14 CAPSULE | Refills: 0 | Status: SHIPPED | OUTPATIENT
Start: 2023-12-20

## 2023-12-20 RX ORDER — PROPOFOL 10 MG/ML
INJECTION, EMULSION INTRAVENOUS CONTINUOUS PRN
Status: DISCONTINUED | OUTPATIENT
Start: 2023-12-20 | End: 2023-12-20

## 2023-12-20 RX ORDER — FENTANYL CITRATE 50 UG/ML
50 INJECTION, SOLUTION INTRAMUSCULAR; INTRAVENOUS EVERY 5 MIN PRN
Status: DISCONTINUED | OUTPATIENT
Start: 2023-12-20 | End: 2023-12-20

## 2023-12-20 RX ORDER — CEFAZOLIN SODIUM/WATER 2 G/20 ML
2 SYRINGE (ML) INTRAVENOUS
Status: COMPLETED | OUTPATIENT
Start: 2023-12-20 | End: 2023-12-20

## 2023-12-20 RX ORDER — FENTANYL CITRATE 50 UG/ML
INJECTION, SOLUTION INTRAMUSCULAR; INTRAVENOUS PRN
Status: DISCONTINUED | OUTPATIENT
Start: 2023-12-20 | End: 2023-12-20

## 2023-12-20 RX ORDER — LIDOCAINE 40 MG/G
CREAM TOPICAL
Status: DISCONTINUED | OUTPATIENT
Start: 2023-12-20 | End: 2023-12-20 | Stop reason: HOSPADM

## 2023-12-20 RX ORDER — HYDROMORPHONE HCL IN WATER/PF 6 MG/30 ML
0.2 PATIENT CONTROLLED ANALGESIA SYRINGE INTRAVENOUS EVERY 5 MIN PRN
Status: DISCONTINUED | OUTPATIENT
Start: 2023-12-20 | End: 2023-12-20

## 2023-12-20 RX ORDER — FENTANYL CITRATE 50 UG/ML
25 INJECTION, SOLUTION INTRAMUSCULAR; INTRAVENOUS EVERY 5 MIN PRN
Status: DISCONTINUED | OUTPATIENT
Start: 2023-12-20 | End: 2023-12-20

## 2023-12-20 RX ORDER — ASPIRIN 81 MG/1
81 TABLET ORAL 2 TIMES DAILY
Status: DISCONTINUED | OUTPATIENT
Start: 2023-12-20 | End: 2023-12-21 | Stop reason: HOSPADM

## 2023-12-20 RX ORDER — ONDANSETRON 4 MG/1
4 TABLET, ORALLY DISINTEGRATING ORAL EVERY 6 HOURS PRN
Status: DISCONTINUED | OUTPATIENT
Start: 2023-12-20 | End: 2023-12-21 | Stop reason: HOSPADM

## 2023-12-20 RX ORDER — LIDOCAINE HYDROCHLORIDE 10 MG/ML
INJECTION, SOLUTION INFILTRATION; PERINEURAL
Status: COMPLETED | OUTPATIENT
Start: 2023-12-20 | End: 2023-12-20

## 2023-12-20 RX ORDER — AMOXICILLIN 250 MG
1-2 CAPSULE ORAL 2 TIMES DAILY
Qty: 30 TABLET | Refills: 0 | Status: SHIPPED | OUTPATIENT
Start: 2023-12-20

## 2023-12-20 RX ORDER — BISACODYL 10 MG
10 SUPPOSITORY, RECTAL RECTAL DAILY PRN
Status: DISCONTINUED | OUTPATIENT
Start: 2023-12-20 | End: 2023-12-21 | Stop reason: HOSPADM

## 2023-12-20 RX ADMIN — ROCURONIUM BROMIDE 50 MG: 10 INJECTION, SOLUTION INTRAVENOUS at 07:28

## 2023-12-20 RX ADMIN — FENTANYL CITRATE 25 MCG: 50 INJECTION INTRAMUSCULAR; INTRAVENOUS at 07:52

## 2023-12-20 RX ADMIN — LABETALOL HYDROCHLORIDE 10 MG: 5 INJECTION, SOLUTION INTRAVENOUS at 09:25

## 2023-12-20 RX ADMIN — FENTANYL CITRATE 50 MCG: 50 INJECTION, SOLUTION INTRAMUSCULAR; INTRAVENOUS at 09:28

## 2023-12-20 RX ADMIN — ATENOLOL 50 MG: 50 TABLET ORAL at 21:22

## 2023-12-20 RX ADMIN — DEXAMETHASONE SODIUM PHOSPHATE 5 MG: 10 INJECTION, SOLUTION INTRAMUSCULAR; INTRAVENOUS at 07:29

## 2023-12-20 RX ADMIN — TRANEXAMIC ACID 1950 MG: 650 TABLET ORAL at 06:34

## 2023-12-20 RX ADMIN — ACETAMINOPHEN 975 MG: 325 TABLET ORAL at 14:02

## 2023-12-20 RX ADMIN — PROPOFOL 200 MG: 10 INJECTION, EMULSION INTRAVENOUS at 07:28

## 2023-12-20 RX ADMIN — MIDAZOLAM 1 MG: 1 INJECTION INTRAMUSCULAR; INTRAVENOUS at 07:08

## 2023-12-20 RX ADMIN — FENTANYL CITRATE 50 MCG: 50 INJECTION INTRAMUSCULAR; INTRAVENOUS at 07:56

## 2023-12-20 RX ADMIN — Medication 125 MCG: at 21:22

## 2023-12-20 RX ADMIN — LABETALOL HYDROCHLORIDE 2.5 MG: 5 INJECTION, SOLUTION INTRAVENOUS at 08:02

## 2023-12-20 RX ADMIN — FENTANYL CITRATE 25 MCG: 50 INJECTION, SOLUTION INTRAMUSCULAR; INTRAVENOUS at 09:23

## 2023-12-20 RX ADMIN — OXYCODONE HYDROCHLORIDE 5 MG: 5 TABLET ORAL at 10:24

## 2023-12-20 RX ADMIN — CEFAZOLIN SODIUM 2 G: 2 INJECTION, SOLUTION INTRAVENOUS at 22:38

## 2023-12-20 RX ADMIN — PHENYLEPHRINE HYDROCHLORIDE 100 MCG: 10 INJECTION INTRAVENOUS at 08:23

## 2023-12-20 RX ADMIN — HYDROMORPHONE HYDROCHLORIDE 0.4 MG: 0.2 INJECTION, SOLUTION INTRAMUSCULAR; INTRAVENOUS; SUBCUTANEOUS at 09:39

## 2023-12-20 RX ADMIN — SUGAMMADEX 100 MG: 100 INJECTION, SOLUTION INTRAVENOUS at 08:59

## 2023-12-20 RX ADMIN — FENTANYL CITRATE 25 MCG: 50 INJECTION INTRAMUSCULAR; INTRAVENOUS at 07:51

## 2023-12-20 RX ADMIN — ASPIRIN 81 MG: 81 TABLET, COATED ORAL at 21:22

## 2023-12-20 RX ADMIN — SODIUM CHLORIDE, POTASSIUM CHLORIDE, SODIUM LACTATE AND CALCIUM CHLORIDE: 600; 310; 30; 20 INJECTION, SOLUTION INTRAVENOUS at 08:37

## 2023-12-20 RX ADMIN — ACETAMINOPHEN 975 MG: 325 TABLET ORAL at 06:33

## 2023-12-20 RX ADMIN — ACETAMINOPHEN 975 MG: 325 TABLET ORAL at 21:25

## 2023-12-20 RX ADMIN — Medication 2 G: at 07:16

## 2023-12-20 RX ADMIN — PHENYLEPHRINE HYDROCHLORIDE 100 MCG: 10 INJECTION INTRAVENOUS at 07:29

## 2023-12-20 RX ADMIN — CEFAZOLIN SODIUM 2 G: 2 INJECTION, SOLUTION INTRAVENOUS at 14:02

## 2023-12-20 RX ADMIN — FENTANYL CITRATE 100 MCG: 50 INJECTION INTRAMUSCULAR; INTRAVENOUS at 07:28

## 2023-12-20 RX ADMIN — PHENYLEPHRINE HYDROCHLORIDE 0.3 MCG/KG/MIN: 10 INJECTION INTRAVENOUS at 07:27

## 2023-12-20 RX ADMIN — PHENYLEPHRINE HYDROCHLORIDE 100 MCG: 10 INJECTION INTRAVENOUS at 07:33

## 2023-12-20 RX ADMIN — LIDOCAINE HYDROCHLORIDE 1 ML: 10 INJECTION, SOLUTION INFILTRATION; PERINEURAL at 07:17

## 2023-12-20 RX ADMIN — ASPIRIN 81 MG: 81 TABLET, COATED ORAL at 12:31

## 2023-12-20 RX ADMIN — PROPOFOL 50 MCG/KG/MIN: 10 INJECTION, EMULSION INTRAVENOUS at 07:36

## 2023-12-20 RX ADMIN — HYDROMORPHONE HYDROCHLORIDE 0.4 MG: 0.2 INJECTION, SOLUTION INTRAMUSCULAR; INTRAVENOUS; SUBCUTANEOUS at 09:50

## 2023-12-20 RX ADMIN — LIDOCAINE HYDROCHLORIDE 5 ML: 10 INJECTION, SOLUTION INFILTRATION; PERINEURAL at 07:28

## 2023-12-20 RX ADMIN — SODIUM CHLORIDE, POTASSIUM CHLORIDE, SODIUM LACTATE AND CALCIUM CHLORIDE: 600; 310; 30; 20 INJECTION, SOLUTION INTRAVENOUS at 06:30

## 2023-12-20 RX ADMIN — SUGAMMADEX 200 MG: 100 INJECTION, SOLUTION INTRAVENOUS at 08:53

## 2023-12-20 RX ADMIN — SODIUM CHLORIDE, POTASSIUM CHLORIDE, SODIUM LACTATE AND CALCIUM CHLORIDE: 600; 310; 30; 20 INJECTION, SOLUTION INTRAVENOUS at 14:02

## 2023-12-20 RX ADMIN — LABETALOL HYDROCHLORIDE 5 MG: 5 INJECTION, SOLUTION INTRAVENOUS at 08:58

## 2023-12-20 RX ADMIN — MIDAZOLAM 1 MG: 1 INJECTION INTRAMUSCULAR; INTRAVENOUS at 07:15

## 2023-12-20 RX ADMIN — LABETALOL HYDROCHLORIDE 2.5 MG: 5 INJECTION, SOLUTION INTRAVENOUS at 08:00

## 2023-12-20 RX ADMIN — ONDANSETRON 4 MG: 2 INJECTION INTRAMUSCULAR; INTRAVENOUS at 07:52

## 2023-12-20 RX ADMIN — PRAVASTATIN SODIUM 40 MG: 20 TABLET ORAL at 21:22

## 2023-12-20 RX ADMIN — OXYCODONE HYDROCHLORIDE 5 MG: 5 TABLET ORAL at 21:23

## 2023-12-20 ASSESSMENT — ACTIVITIES OF DAILY LIVING (ADL)
ADLS_ACUITY_SCORE: 22
ADLS_ACUITY_SCORE: 20
ADLS_ACUITY_SCORE: 22
ADLS_ACUITY_SCORE: 20
ADLS_ACUITY_SCORE: 24
ADLS_ACUITY_SCORE: 24
ADLS_ACUITY_SCORE: 22
ADLS_ACUITY_SCORE: 20
ADLS_ACUITY_SCORE: 20

## 2023-12-20 NOTE — INTERVAL H&P NOTE
"I have reviewed the surgical (or preoperative) H&P that is linked to this encounter, and examined the patient. There are no significant changes    Clinical Conditions Present on Arrival:  Clinically Significant Risk Factors Present on Admission          # Hypercalcemia: Highest Ca = 10.8 mg/dL in last 30 days, will monitor as appropriate         # Severe Obesity: Estimated body mass index is 40.68 kg/m  as calculated from the following:    Height as of this encounter: 1.448 m (4' 9\").    Weight as of this encounter: 85.3 kg (188 lb).       "

## 2023-12-20 NOTE — PROGRESS NOTES
12/20/23 0254   Appointment Info   Signing Clinician's Name / Credentials (PT) Eusebio Wayne, PT   Quick Adds   Quick Adds Certification   Living Environment   People in Home alone   Current Living Arrangements apartment   Home Accessibility no concerns   Self-Care   Usual Activity Tolerance moderate   Current Activity Tolerance moderate   Equipment Currently Used at Home grab bar, tub/shower;cane, straight   Fall history within last six months no   Activity/Exercise/Self-Care Comment Indep with all ADL's, I AdL's, does not drive at baseline   General Information   Onset of Illness/Injury or Date of Surgery 12/20/23   Pertinent History of Current Problem (include personal factors and/or comorbidities that impact the POC) s/p L GIOVANA   Existing Precautions/Restrictions no known precautions/restrictions   Weight-Bearing Status - LLE weight-bearing as tolerated   Cognition   Affect/Mental Status (Cognition) WFL   Range of Motion (ROM)   ROM Comment decreased ROM s/p L GIOVANA, decreased dorsiflexion which patient states is baseline   Strength (Manual Muscle Testing)   Strength (Manual Muscle Testing) No deficits observed during functional mobility   Transfers   Transfers sit-stand transfer   Sit-Stand Transfer   Sit-Stand [place] (Transfers) contact guard;verbal cues   Assistive Device (Sit-Stand Transfers) walker, front-wheeled   Gait/Stairs (Locomotion)   [place] Level (Gait) verbal cues;contact guard   Assistive Device (Gait) walker, front-wheeled   Distance in Feet (Gait) 20   Pattern (Gait) step-to   Balance   Balance no deficits were identified   Clinical Impression   Criteria for Skilled Therapeutic Intervention Yes, treatment indicated   PT Diagnosis (PT) impaired functional mobility   Influenced by the following impairments pain, decreased ROM, impaired balance, decreased strength   Functional limitations due to impairments gait, stairs, transfers   Clinical Presentation (PT Evaluation Complexity)  stable   Clinical Presentation Rationale pt presents as medically diagnosed   Clinical Decision Making (Complexity) low complexity   Planned Therapy Interventions (PT) gait training;home exercise program;patient/family education;stair training;transfer training   Risk & Benefits of therapy have been explained care plan/treatment goals reviewed;patient   PT Total Evaluation Time   PT Eval, Low Complexity Minutes (91287) 10   Therapy Certification   Start of care date 12/20/23   Certification date from 12/20/23   Certification date to 01/19/24   Physical Therapy Goals   PT Frequency Daily   PT Goals PT Goal 1;Transfers;Gait   PT: Transfers Modified independent;Sit to/from stand;Assistive device;Within precautions   PT: Gait Modified independent;Rolling walker;150 feet   PT: Goal 1 Independent with written HEP for LE strengthening and ROM   Interventions   Interventions Quick Adds Therapeutic Procedure;Therapeutic Activity;Gait Training   Therapeutic Procedure/Exercise   Ther. Procedure: strength, endurance, ROM, flexibillity Minutes (78636) 15   Symptoms Noted During/After Treatment increased pain;fatigue   Treatment Detail/Skilled Intervention GIOVANA protocol therex x10 reps, cueing for technique, Independent with written HEP following education   Therapeutic Activity   Treatment Detail/Skilled Intervention sit to/from stand, cueing for safe hand placement and LE positioning, Mod I following education   Gait Training   Gait Training Minutes (67910) 10   Symptoms Noted During/After Treatment (Gait Training) fatigue   Treatment Detail/Skilled Intervention slow stable gait, vc for reciprocala steps, educated on walker styles , adjustment as her walker will not work for her   Distance in Feet 200   [place] Level (Gait Training) stand-by assist   Physical Assistance Level (Gait Training) supervision;verbal cues;1 person assist   Weight Bearing (Gait Training) weight-bearing as tolerated   Assistive Device (Gait  Training) rolling walker   Pattern Analysis (Gait Training) swing-through gait   Gait Analysis Deviations decreased edgar;decreased step length;decreased toe-to-floor clearance   Impairments (Gait Analysis/Training) pain;strength decreased;ROM decreased   PT Discharge Planning   PT Plan progress per GIOVANA protocol   PT Discharge Recommendation (DC Rec) other (see comments)   PT Rationale for DC Rec per ortho MD   PT Brief overview of current status Patient SBA for gait/transfers   PT Equipment Needed at Discharge cane, straight;walker, rolling   Total Session Time   Timed Code Treatment Minutes 25   Total Session Time (sum of timed and untimed services) 35   [facility]  OUTPATIENT PHYSICAL THERAPY EVALUATION  PLAN OF TREATMENT FOR OUTPATIENT REHABILITATION  (COMPLETE FOR INITIAL CLAIMS ONLY)  Patient's Last Name, First Name, M.I.  YOB: 1945  Felecia Diana                        Provider's Name  [facility] Medical Record No.  [id number]                             Onset Date:  12/20/23   Start of Care Date:  12/20/23   Type:     _X_PT   ___OT   ___SLP Medical Diagnosis:                 PT Diagnosis:  impaired functional mobility Visits from SOC:  1     See note for plan of treatment, functional goals and certification details    I CERTIFY THE NEED FOR THESE SERVICES FURNISHED UNDER        THIS PLAN OF TREATMENT AND WHILE UNDER MY CARE     (Physician co-signature of this document indicates review and certification of the therapy plan).

## 2023-12-20 NOTE — ANESTHESIA PREPROCEDURE EVALUATION
Anesthesia Pre-Procedure Evaluation    Patient: Felecia Diana   MRN: [id number] : 1945        Procedure : Procedure(s):  LEFT DIRECT ANTERIOR TOTAL HIP ARTHROPLASTY          Past Medical History:   Diagnosis Date    Arthritis     Diabetes (H)     Heart murmur     Hypertension     Recurrent UTI     Syncope     Uncomplicated asthma       History reviewed. No pertinent surgical history.   Allergies   Allergen Reactions    Aspirin Other (See Comments) and Unknown     ulcers    Lisinopril Unknown     Left arm weakness (missed 2 weeks of work), rash      Sulfamethoxazole-Trimethoprim Unknown      Social History     Tobacco Use    Smoking status: Never    Smokeless tobacco: Never   Substance Use Topics    Alcohol use: Yes     Comment: rare      Wt Readings from Last 1 Encounters:   23 85.3 kg (188 lb)        Anesthesia Evaluation   Pt has had prior anesthetic.     No history of anesthetic complications       ROS/MED HX  ENT/Pulmonary:     (+)                     asthma                  Neurologic:  - neg neurologic ROS     Cardiovascular: Comment: ECHOCARDIOGRAM 11/15/2022:  1. Severe septal hypertrophy (2.3cm). There is probably also apical lateral hypertrophy (Not well seen).  2. No left ventricular outflow tract obstruction at rest.  3. Hyperdynamic left ventricular systolic function estimated left ventricular ejection fraction is 65-70%.  4. No regional wall motion abnormalities.  5. Normal right ventricular chamber size. Normal right ventricular systolic function.  6. No significant valvular heart disease.  There were no prior studies available for comparison.  This study was originally intended to be a stress echocardiogram. After the resting echocardiogram showed left ventricular hypertrophy suggestive of hypertrophic cardiomyopathy, the stress portion was cancelled. The patient was scheduled for outpatient cardiology consult. Cardiac MR is recommended.  Estimated EF: 65-70%    CMR 2023:  1.  Severe asymmetric hypertrophy involving the basal anteroseptum, and mid anterior, anteroseptal and  inferoseptal segments. Most hypertrophied segment is the basal anteroseptal segment measuring 19mm. Subtle  MRI evidence of LVOT obstruction.   2. LV cavity size is normal. LV systolic function is normal. Quantitative LVEF 61 %.   3. No definite hyperenhancement is appreciated.   4. RV cavity size is normal. RV systolic function is normal. Quantitative RVEF 72 %.  5. Mitral valve leaflets are normal. No systolic anterior motion of the mitral valve.       (+)  hypertension- -   -  - -                                 Previous cardiac testing (hypertrophic cardiomyopathy)     METS/Exercise Tolerance:     Hematologic:  - neg hematologic  ROS     Musculoskeletal:       GI/Hepatic:  - neg GI/hepatic ROS     Renal/Genitourinary:  - neg Renal ROS     Endo:     (+)  type II DM,             Obesity,       Psychiatric/Substance Use:       Infectious Disease:       Malignancy:       Other:            Physical Exam    Airway        Mallampati: II   TM distance: > 3 FB   Neck ROM: full   Mouth opening: > 3 cm    Respiratory Devices and Support         Dental       (+) Minor Abnormalities - some fillings, tiny chips      Cardiovascular          Rhythm and rate: regular and normal     Pulmonary           breath sounds clear to auscultation           OUTSIDE LABS:  CBC:   Lab Results   Component Value Date    WBC 6.4 11/28/2023    WBC 6.0 12/20/2022    HGB 13.0 11/28/2023    HGB 13.6 12/20/2022    HCT 39.3 11/28/2023    HCT 40.8 12/20/2022     11/28/2023     12/20/2022     BMP:   Lab Results   Component Value Date     11/28/2023     12/20/2022    POTASSIUM 4.3 11/28/2023    POTASSIUM 4.5 12/20/2022    CHLORIDE 102 11/28/2023    CHLORIDE 102 12/20/2022    CO2 24 11/28/2023    CO2 26 12/20/2022    BUN 15.1 11/28/2023    BUN 17.7 12/20/2022    CR 0.79 11/28/2023    CR 0.88 12/20/2022     (H) 12/20/2023     "GLC 99 11/28/2023     COAGS: No results found for: \"PTT\", \"INR\", \"FIBR\"  POC: No results found for: \"BGM\", \"HCG\", \"HCGS\"  HEPATIC:   Lab Results   Component Value Date    ALBUMIN 4.4 12/20/2022    PROTTOTAL 7.4 12/20/2022    ALT 20 12/20/2022    AST 28 12/20/2022    ALKPHOS 92 12/20/2022    BILITOTAL 0.2 12/20/2022     OTHER:   Lab Results   Component Value Date    LOYD 10.8 (H) 11/28/2023    MAG 2.0 12/20/2022    TSH 0.96 09/21/2021       Anesthesia Plan    ASA Status:  3    NPO Status:  NPO Appropriate    Anesthesia Type: General.     - Airway: ETT   Induction: Intravenous.      Techniques and Equipment:     - Lines/Monitors: Arterial Line     Consents    Anesthesia Plan(s) and associated risks, benefits, and realistic alternatives discussed. Questions answered and patient/representative(s) expressed understanding.     - Discussed: Risks, Benefits and Alternatives for the PROCEDURE were discussed     - Discussed with:  Patient            Postoperative Care    Pain management: IV analgesics, Oral pain medications.   PONV prophylaxis: Ondansetron (or other 5HT-3), Dexamethasone or Solumedrol     Comments:    Other Comments: Phenylephrine gtt, maintain euvolemia. Avoid ephedrine as it may worsen any LVOT obstruction.           Shaun Clinton MD    I have reviewed the pertinent notes and labs in the chart from the past 30 days and (re)examined the patient.  Any updates or changes from those notes are reflected in this note.      # Hypercalcemia: Highest Ca = 10.8 mg/dL in last 30 days, will monitor as appropriate         # Severe Obesity: Estimated body mass index is 40.68 kg/m  as calculated from the following:    Height as of this encounter: 1.448 m (4' 9\").    Weight as of this encounter: 85.3 kg (188 lb).      "

## 2023-12-20 NOTE — CONSULTS
[facility] MEDICINE CONSULT NOTE   Physician requesting consult: Giorgio Alexander MD    Reason for consult: Postoperative medical management of medical co-morbidities as below    Assessment and Plan    Felecia Diana is a 78 year old old female with a history of hypertension and hyperlipidemia who underwent a left direct total hip arthroplasty. [facility] [place] evaluate patient for postoperative medical management as follows below. Please resume the home medications as reconciled and further noted below with ordered hold parameters.  Vital signs have been stable post-operatively including hemodynamically stable blood pressure and heart rate. Thank you for this consult; we will continue to follow this patient until discharge.    Left total hip arthroplasty  Routine postoperative cares  PT OT  Discharge a.m.    Essential hypertension  Home meds with hold parameters    Hyperlipidemia  Statin    Diet-controlled diabetes    Morbid obesity      Procedure(s):  LEFT DIRECT ANTERIOR TOTAL HIP ARTHROPLASTY  Post-operative Day: Day of Surgery  Code status:Full Code       Estimated Blood Loss:  150 mL    Hospital Problem List   No problem-specific Assessment & Plan notes found for this encounter.    Principal Problem:    Orthopedic aftercare for joint replacement      -Reviewed the patient's preoperative H and P and updated missing elements.  -Home medication reconciliation has been reviewed.  Medications have been ordered as noted from the home list and changes are documented above     HISTORY     Felecia Diana is a 78 year old old female with a history of hypertension hyperlipidemia who presents for an elective orthopedic procedure.  Please the operative over 2 so surgery.  Please H&P which was reviewed by me for preoperative course.  Role of hospital medicine discussed and questions answered.  Currently patient is doing okay.  Minimal pain.  She is eating.  Has some supplemental oxygen on.   She denies sleep apnea.  No history of DVT or PE.  No history of coronary disease.  She is diabetic but is diet controlled and states her last A1c was under 7.  She has never been on any medication.  She has trouble emptying her bladder occasionally and does have problems with urinary frequency.  She tends to get constipated intermittently as well.  No history of sleep apnea.    Past Medical History     Patient Active Problem List    Diagnosis Date Noted    Orthopedic aftercare for joint replacement 12/20/2023     Priority: Medium        Surgical History   She  has no past surgical history on file.   History reviewed. No pertinent surgical history.    Family History    Reviewed, and noncontributory to this admission    Social History    Reviewed, and she  reports that she has never smoked. She has never used smokeless tobacco. She reports current alcohol use. She reports that she does not use drugs.  Social History     Tobacco Use    Smoking status: Never    Smokeless tobacco: Never   Substance Use Topics    Alcohol use: Yes     Comment: rare       Allergies     Allergies   Allergen Reactions    Aspirin Other (See Comments) and Unknown     ulcers    Lisinopril Unknown     Left arm weakness (missed 2 weeks of work), rash      Sulfamethoxazole-Trimethoprim Unknown       Prior to Admission Medications      Medications Prior to Admission   Medication Sig Dispense Refill Last Dose    atenolol (TENORMIN) 50 MG tablet Take 50 mg by mouth at bedtime   12/19/2023 at PM    Cholecalciferol (VITAMIN D-3) 125 MCG (5000 UT) TABS Take 1 tablet by mouth at bedtime   12/19/2023 at PM    pravastatin (PRAVACHOL) 40 MG tablet Take 40 mg by mouth at bedtime   12/19/2023 at PM       Review of Systems   A 12 point comprehensive review of systems was negative except as noted above.    OBJECTIVE         Physical Exam   Temp:  [97.6  F (36.4  C)-99.7  F (37.6  C)] 98.1  F (36.7  C)  Pulse:  [51-88] 51  Resp:  [8-18] 17  BP:  "()/() 108/55  SpO2:  [87 %-98 %] 93 %  Body mass index is 40.68 kg/m .  GENERAL:  Alert, appears comfortable, in no acute distress, appears stated age   HEAD:  Normocephalic, without obvious abnormality, atraumatic   EYES:  PERRL, conjunctiva/corneas clear, no scleral icterus, EOM's intact   LUNGS:   Clear to auscultation bilaterally, no rales, rhonchi, or wheezing, symmetric chest rise on inhalation, respirations unlabored   CHEST WALL:  No tenderness or deformity   HEART:  Regular rate and rhythm, S1 and S2 normal, no murmur, rub, or gallop    ABDOMEN:   Soft, non-tender, bowel sounds active all four quadrants, no masses, no organomegaly, no rebound or guarding   EXTREMITIES: Extremities normal, atraumatic, no cyanosis or edema    SKIN: Dry to touch, no exanthems in the visualized areas   NEURO: Alert, oriented x 4, moves all four extremities freely/spontaneously   PSYCH: Cooperative, behavior is appropriate          Imaging Reviewed Personally By Myself    Radiology Results:   Recent Results (from the past 24 hour(s))   POC US Guidance Needle Placement    Narrative    Ultrasound was performed as guidance to an anesthesia procedure.  Click   \"PACS images\" hyperlink below to view any stored images.  For specific   procedure details, view procedure note authored by anesthesia.   XR Pelvis w Hip Port Left  1 View    Narrative    EXAM: XR PELVIS AND HIP PORTABLE LEFT 1 VIEW  LOCATION: [facility]  DATE: 12/20/2023    INDICATION: Status post Hip surgery  COMPARISON: 08/31/2023.      Impression    IMPRESSION: Status post recent left hip arthroplasty. No immediate hardware complication. Expected postsurgical soft tissue edema and subcutaneous emphysema. No acute fracture or malalignment.       Labs Reviewed Personally By Myself     Results for orders placed or performed during the hospital encounter of 12/20/23 (from the past 24 hour(s))   Glucose by meter   Result Value Ref Range    " "GLUCOSE BY METER POCT 100 (H) 70 - 99 mg/dL   POC US Guidance Needle Placement    Narrative    Ultrasound was performed as guidance to an anesthesia procedure.  Click   \"PACS images\" hyperlink below to view any stored images.  For specific   procedure details, view procedure note authored by anesthesia.   Glucose by meter   Result Value Ref Range    GLUCOSE BY METER POCT 161 (H) 70 - 99 mg/dL   XR Pelvis w Hip Port Left  1 View    Narrative    EXAM: XR PELVIS AND HIP PORTABLE LEFT 1 VIEW  LOCATION: [facility]  DATE: 12/20/2023    INDICATION: Status post Hip surgery  COMPARISON: 08/31/2023.      Impression    IMPRESSION: Status post recent left hip arthroplasty. No immediate hardware complication. Expected postsurgical soft tissue edema and subcutaneous emphysema. No acute fracture or malalignment.   Glucose by meter   Result Value Ref Range    GLUCOSE BY METER POCT 153 (H) 70 - 99 mg/dL     Most Recent 3 CBC's:  Recent Labs   Lab Test 11/28/23  0959 12/20/22 2016   WBC 6.4 6.0   HGB 13.0 13.6   MCV 98 96    203     Most Recent 3 BMP's:  Recent Labs   Lab Test 12/20/23  1340 12/20/23  0912 12/20/23  0617 11/28/23  0959 12/20/22 2016 11/04/22  1031   NA  --   --   --  138 139 138   POTASSIUM  --   --   --  4.3 4.5 4.4   CHLORIDE  --   --   --  102 102 101   CO2  --   --   --  24 26 23   BUN  --   --   --  15.1 17.7 13.3   CR  --   --   --  0.79 0.88 0.87   ANIONGAP  --   --   --  12 11 14   LOYD  --   --   --  10.8* 10.6* 10.5*   * 161* 100* 99 111* 114*     Most Recent 2 LFT's:  Recent Labs   Lab Test 12/20/22 2016 11/04/22  1031   AST 28 24   ALT 20 23   ALKPHOS 92 81   BILITOTAL 0.2 0.3     Most Recent 3 INR's:No lab results found.    Preoperative Labs Reviewed Personally By Myself     Thank you for this consultation.  Appreciate the opportunity to participate in the care of Felecia Diana, please feel free to contact us for any questions or concerns.    René Michael, " MD  [facility]  [facility]  [facility]  Phone: #[phone]

## 2023-12-20 NOTE — ANESTHESIA CARE TRANSFER NOTE
Patient: Felecia Diana    Procedure: Procedure(s):  LEFT DIRECT ANTERIOR TOTAL HIP ARTHROPLASTY       Diagnosis: Osteoarthritis of left hip [M16.12]  Diagnosis Additional Information: No value filed.    Anesthesia Type:   General     Note:    Oropharynx: oropharynx clear of all foreign objects  Level of Consciousness: drowsy  Oxygen Supplementation: face mask  Level of Supplemental Oxygen (L/min / FiO2): 8  Independent Airway: airway patency satisfactory and stable  Dentition: dentition unchanged  Vital Signs Stable: post-procedure vital signs reviewed and stable  Report to RN Given: handoff report given  Patient transferred to: PACU    Handoff Report: Identifed the Patient, Identified the Reponsible Provider, Reviewed the pertinent medical history, Discussed the surgical course, Reviewed Intra-OP anesthesia mangement and issues during anesthesia, Set expectations for post-procedure period and Allowed opportunity for questions and acknowledgement of understanding      Vitals:  Vitals Value Taken Time   /88 12/20/23 0909   Temp 37.4  C (99.3  F) 12/20/23 0906   Pulse 78 12/20/23 0908   Resp 17 12/20/23 0908   SpO2 93 % 12/20/23 0908   Vitals shown include unfiled device data.    Electronically Signed By: LATISHA Coleman CRNA  December 20, 2023  9:10 AM

## 2023-12-20 NOTE — PHARMACY-ADMISSION MEDICATION HISTORY
Pharmacist ROSARIO Medication History    Admission medication history is complete. The information provided in this note is only as accurate as the sources available at the time of the update.    Medication reconciliation/reorder completed by provider prior to medication history? No    Information Source(s): Patient and Clinic records and Care Everywhere via in-person    Pertinent Information: N/A    Medication Affordability:  Not including over the counter (OTC) medications, was there a time in the past 3 months when you did not take your medications as prescribed because of cost?: No    Allergies reviewed with patient and updates made in EHR: yes    Medications available for use during hospital stay: None.      Medication History Completed By: Telly Worthington [facility] 12/20/2023 6:14 AM    PTA Med List   Medication Sig Last Dose    atenolol (TENORMIN) 50 MG tablet Take 50 mg by mouth at bedtime 12/19/2023 at PM    Cholecalciferol (VITAMIN D-3) 125 MCG (5000 UT) TABS Take 1 tablet by mouth at bedtime 12/19/2023 at PM    pravastatin (PRAVACHOL) 40 MG tablet Take 40 mg by mouth at bedtime 12/19/2023 at PM

## 2023-12-20 NOTE — OP NOTE
Operative Note    Name:  Felecia Diana  Location: [facility] Main OR  Procedure Date:  12/20/2023  PCP:  [facility]      Procedure(s):  LEFT DIRECT ANTERIOR TOTAL HIP ARTHROPLASTY     Implant Name Type Inv. Item Serial No.  Lot No. LRB No. Used Action   IMP HEAD FEMORAL STRK BIOLOX DELTA CERAMIC 32MM 0MM - [id number] Total Joint Component/Insert IMP HEAD FEMORAL STRK BIOLOX DELTA CERAMIC 32MM 0MM  [employer] [id number] Left 1 Implanted   INSERT ACTB 32MM 0D C HIP X3 TRDNT STRL LF - [id number] Total Joint Component/Insert INSERT ACTB 32MM 0D C HIP X3 TRDNT STRL LF  [id number] Left 1 Implanted   SHELL ACETABULAR 46C SOLIDBACK TRITANIUM - [id number] Total Joint Component/Insert SHELL ACETABULAR 46C SOLIDBACK TRITANIUM  [employer] [id number] Left 1 Implanted   INSIGNIA HIP STEM HIGH OFFSET SIZE 1 Total Joint Component/Insert   RENEE [id number] Left 1 Implanted       Pre-Procedure Diagnosis:  Osteoarthritis of left hip [M16.12]     Post-Procedure Diagnosis:    same    Surgeon(s):  Giorgio Alexander MD Balster, Alicia, PA-C    Assistants:  Required for patient positioning, intraoperative retraction, patient safety, and wound closure.    Anesthesia Type:  Spinal     Findings:  Arthritis    Operative Report:      After satisfactory infiltration of spinal block anesthetic, the patient was placed supine on the table. Both lower extremities were then prepped and draped in usual sterile fashion. A timeout was then conducted to ensure proper surgical site, after which a longitudinal incision was made just lateral and distal to the ASIS. The tensor fascia was then incised, and the muscle belly retracted lateral. The circumflex vessels were cauterized and divided. Blunt retractors then placed over the superior and inferior capsule, and an anterior capsulectomy performed. The femoral neck cut was then made per preoperative templating. The disc of bone was removed from  the femoral neck. The femoral head was removed with a corkscrew reamer. The femur was retracted posteriorly exposing the acetabulum. All soft tissues were removed from the acetabulum, and the acetabulum sequentially reamed to accommodate the shell. We reamed line to line. Impaction of the shell showed it had excellent scratch fit. The dome hole cover was placed. At this point an intraoperative standardized cocktail of was infiltrated around the periarticular soft tissues. The polythene liner was then snapped into place. The proximal femur was then delivered into the wound through a series of proximal femoral capsular releases, hip hyperextension, adduction, and external rotation. The femur was then sequentially broached to accommodate a size 1 stem, high offset is what we templated. Trial showed a +0  head to be the most appropriate for leg lengths.  There was  excellent stability. Trials were then removed, the wound copiously irrigated, and the stem impacted followed by the head. The hip was reduced, checked for interposition and impingement. The wound thoroughly irrigated once more. Bleeders cauterized. The wound was then closed in layers: 2-0 Vicryl for the tensor fascia, 2-0 Vicryl for the subcutaneous tendinous tissues, 3-0 Monocryl for the skin, followed by Dermabond. Sterile dressings were applied.  The patient was then awakened and taken to the [facility] stable.  The patient tolerated the procedure well. There were no apparent intraoperative complications. The plan is to have the patient weight-bear as tolerated, no postoperative hip precautions or restrictions. We'll place the patient on a multimodal DVT prophylaxis protocol including early ambulation, mechanical plexus, a chemical prophylaxis ×1 month      Estimated Blood Loss:   100cc         Drains:        Complications:    None    Giorgio Alexander MD     Date: 12/20/2023  Time: 8:38 AM

## 2023-12-20 NOTE — ANESTHESIA PROCEDURE NOTES
Arterial Line Procedure Note    Pre-Procedure   Staff -        Anesthesiologist:  Shaun Clinton MD       Performed By: anesthesiologist       Location: OR       Pre-Anesthestic Checklist: patient identified, IV checked, risks and benefits discussed, informed consent, monitors and equipment checked and pre-op evaluation  Timeout:       Correct Patient: Yes        Correct Procedure: Yes        Correct Site: Yes        Correct Position: Yes   Line Placement:   This line was placed Pre Induction starting at 12/20/2023 7:17 AM and ending at 12/20/2023 7:25 AM  Procedure   Procedure: arterial line and new line       Laterality: left       Insertion Site: brachial (1st attempt radial, unable to pass wire).  Sterile Prep        Standard elements of sterile barrier followed       Skin prep: Chloraprep  Insertion/Injection        Technique: ultrasound guided        1. Ultrasound was used to evaluate the access site.       2. Artery evaluated via ultrasound for patency/adequacy.       3. Using real-time ultrasound the needle/catheter was observed entering the artery/vein.       4. Permanent image was captured and entered into the patient's record.       5. The visualized structures were anatomically normal.       6. There were no apparent abnormal pathologic findings.       Catheter Type/Size: 20 G, 12 cm  Narrative         Secured by: anchor securement device       Tegaderm and Biopatch dressing used.       Complications: None apparent,        Arterial waveform: Yes        IBP within 10% of NIBP: Yes

## 2023-12-20 NOTE — ANESTHESIA POSTPROCEDURE EVALUATION
Patient: Felecia Diana    Procedure: Procedure(s):  LEFT DIRECT ANTERIOR TOTAL HIP ARTHROPLASTY       Anesthesia Type:  General    Note:     Postop Pain Control: Uneventful            Sign Out: Well controlled pain   PONV: No   Neuro/Psych: Uneventful            Sign Out: Acceptable/Baseline neuro status   Airway/Respiratory: Uneventful            Sign Out: Acceptable/Baseline resp. status   CV/Hemodynamics: Uneventful            Sign Out: Acceptable CV status; No obvious hypovolemia; No obvious fluid overload   Other NRE: NONE   DID A NON-ROUTINE EVENT OCCUR? No           Last vitals:  Vitals Value Taken Time   /69 12/20/23 1010   Temp 37.4  C (99.3  F) 12/20/23 0906   Pulse 72 12/20/23 1010   Resp 8 12/20/23 1010   SpO2 94 % 12/20/23 1010   Vitals shown include unfiled device data.    Electronically Signed By: Shaun Clinton MD  December 20, 2023  10:17 AM

## 2023-12-20 NOTE — ANESTHESIA PROCEDURE NOTES
Airway       Patient location during procedure: OR       Procedure Start/Stop Times: 12/20/2023 7:30 AM and 12/20/2023 7:32 AM  Staff -        CRNA: Solomon Mata APRN CRNA       Performed By: CRNA  Consent for Airway        Urgency: elective  Indications and Patient Condition       Indications for airway management: danica-procedural       Induction type:intravenous       Mask difficulty assessment: 2 - vent by mask + OA or adjuvant +/- NMBA    Final Airway Details       Final airway type: endotracheal airway       Successful airway: ETT - single  Endotracheal Airway Details        ETT size (mm): 7.0       Cuffed: yes       Successful intubation technique: direct laryngoscopy       DL Blade Type: Munoz 2       Grade View of Cords: 1       Adjucts: stylet       Position: Right       Measured from: lips       Secured at (cm): 21       Bite block used: None    Post intubation assessment        Placement verified by: capnometry, equal breath sounds and chest rise        Number of attempts at approach: 1       Number of other approaches attempted: 0       Secured with: tape       Ease of procedure: easy       Dentition: Unchanged and Intact       Dental guard used and removed. Dental Guard Type: Proguard Red.    Medication(s) Administered   Medication Administration Time: 12/20/2023 7:30 AM

## 2023-12-20 NOTE — PROVIDER NOTIFICATION
Dr. Clinton notified of systolic blood pressures in the 190's. Verbal orders received for 10mg IV labetolol. Instructed to discharge arterial line when phase 1 criteria met.

## 2023-12-21 ENCOUNTER — APPOINTMENT (OUTPATIENT)
Dept: OCCUPATIONAL THERAPY | Facility: CLINIC | Age: 78
End: 2023-12-21
Attending: ORTHOPAEDIC SURGERY
Payer: COMMERCIAL

## 2023-12-21 ENCOUNTER — APPOINTMENT (OUTPATIENT)
Dept: PHYSICAL THERAPY | Facility: CLINIC | Age: 78
End: 2023-12-21
Attending: ORTHOPAEDIC SURGERY
Payer: COMMERCIAL

## 2023-12-21 VITALS
WEIGHT: 188 LBS | OXYGEN SATURATION: 92 % | BODY MASS INDEX: 40.56 KG/M2 | SYSTOLIC BLOOD PRESSURE: 113 MMHG | HEIGHT: 57 IN | DIASTOLIC BLOOD PRESSURE: 56 MMHG | TEMPERATURE: 98.9 F | RESPIRATION RATE: 16 BRPM | HEART RATE: 57 BPM

## 2023-12-21 LAB
FASTING STATUS PATIENT QL REPORTED: ABNORMAL
GLUCOSE SERPL-MCNC: 118 MG/DL (ref 70–99)
HGB BLD-MCNC: 11 G/DL (ref 11.7–15.7)

## 2023-12-21 PROCEDURE — 85018 HEMOGLOBIN: CPT | Performed by: PHYSICIAN ASSISTANT

## 2023-12-21 PROCEDURE — 97535 SELF CARE MNGMENT TRAINING: CPT | Mod: GO

## 2023-12-21 PROCEDURE — 97166 OT EVAL MOD COMPLEX 45 MIN: CPT | Mod: GO

## 2023-12-21 PROCEDURE — 97116 GAIT TRAINING THERAPY: CPT | Mod: GP

## 2023-12-21 PROCEDURE — 36415 COLL VENOUS BLD VENIPUNCTURE: CPT | Performed by: PHYSICIAN ASSISTANT

## 2023-12-21 PROCEDURE — 99214 OFFICE O/P EST MOD 30 MIN: CPT | Performed by: FAMILY MEDICINE

## 2023-12-21 PROCEDURE — 97110 THERAPEUTIC EXERCISES: CPT | Mod: GP

## 2023-12-21 PROCEDURE — 250N000013 HC RX MED GY IP 250 OP 250 PS 637: Performed by: PHYSICIAN ASSISTANT

## 2023-12-21 PROCEDURE — 82947 ASSAY GLUCOSE BLOOD QUANT: CPT | Performed by: ORTHOPAEDIC SURGERY

## 2023-12-21 RX ORDER — POLYETHYLENE GLYCOL 3350 17 G/17G
17 POWDER, FOR SOLUTION ORAL DAILY PRN
COMMUNITY
Start: 2023-12-21 | End: 2024-01-04

## 2023-12-21 RX ADMIN — OXYCODONE HYDROCHLORIDE 5 MG: 5 TABLET ORAL at 10:54

## 2023-12-21 RX ADMIN — OXYCODONE HYDROCHLORIDE 5 MG: 5 TABLET ORAL at 06:32

## 2023-12-21 RX ADMIN — ASPIRIN 81 MG: 81 TABLET, COATED ORAL at 09:12

## 2023-12-21 RX ADMIN — ACETAMINOPHEN 975 MG: 325 TABLET ORAL at 06:18

## 2023-12-21 ASSESSMENT — ACTIVITIES OF DAILY LIVING (ADL)
ADLS_ACUITY_SCORE: 24
ADLS_ACUITY_SCORE: 24
ADLS_ACUITY_SCORE: 26
ADLS_ACUITY_SCORE: 24

## 2023-12-21 NOTE — PLAN OF CARE
VSS- pt rates pain minimal, a 2/10, no pain medication needed. Dressing CDI, CMS intact. Tolerating oral intake. NS running at 50. On room air.

## 2023-12-21 NOTE — PROGRESS NOTES
"Orthopedic Progress Note      Assessment: 1 Day Post-Op  S/P Procedure(s):  LEFT DIRECT ANTERIOR TOTAL HIP ARTHROPLASTY       Plan:   - Continue PT/OT - goals met per teams  - Weightbearing status: WBAT LLE  - Anticoagulation: Aspirin 81mg po bid x 30 days, in addition to SCDs, renetta stockings and early ambulation  - Antibiotics: Duricef 500mg bid x 7 days upon discharge   - Discharge planning: Discharge to home today       Subjective:  Pain: Well controlled on Tylenol and Oxycodone  Nausea, Vomiting:  No  Chest pain: No  Lightheadedness, Dizziness:  No  Neuro:  Patient denies new onset numbness or paresthesias     Patient doing well on POD #1. Pain is controlled with oral medications. States her thigh is sore. Ambulating well and cleared by therapies. Tolerating oral intake without nausea or vomiting. Voiding & passing gas.     Hgb 11.0 (13.0 pre-op)       Objective:  BP 97/49 (BP Location: Right arm)   Pulse 57   Temp 98.9  F (37.2  C) (Oral)   Resp 16   Ht 1.448 m (4' 9\")   Wt 85.3 kg (188 lb)   SpO2 92%   BMI 40.68 kg/m    The patient is A&Ox3. Appears comfortable, sitting up at bedside.  Calves are soft and non-tender bilaterally  Brisk capillary refill in the toes.   Palpable Left dorsalis pedis pulse. Foot warm & well-perfused.  Sensation is intact to light touch & equal bilaterally in the femoral, DP, SP & tibial nerve distributions.  ROM: Flexes at Left hip. Appropriately flexes & extends all toes bilaterally.   Motor: +5/5 dorsiflexion, plantar flexion & EHL bilaterally. Fires quad.   Leg lengths equal.  Left anterior hip dressing C/D/I without strikethrough, no surrounding erythema.   5/5 TA/GSC/EHL      Pertinent Labs   Lab Results: personally reviewed.   No results found for: \"INR\", \"PROTIME\"  Lab Results   Component Value Date    WBC 6.4 11/28/2023    HGB 11.0 (L) 12/21/2023    HCT 39.3 11/28/2023    MCV 98 11/28/2023     11/28/2023     Lab Results   Component Value Date     " 11/28/2023    CO2 24 11/28/2023         Report completed by:  Jeannie Way PA-C/Dr. Alexander  [facility]    Date: 12/21/2023  Time: 10:24 AM

## 2023-12-21 NOTE — PROGRESS NOTES
Physical Therapy Discharge Summary    Reason for therapy discharge:    All goals and outcomes met, no further needs identified.    Progress towards therapy goal(s). See goals on Care Plan in [facility] electronic health record for goal details.  Goals met    Therapy recommendation(s):    Continue home exercise program.

## 2023-12-21 NOTE — DISCHARGE SUMMARY
ORTHOPEDIC DISCHARGE SUMMARY       Felecia Diana,  1945, MRN [id number]    Admission Date: 2023      Admission Diagnoses: Osteoarthritis of left hip [M16.12]  Orthopedic aftercare for joint replacement [Z47.1]     Discharge Date:  2023    Post-operative Day:  1 Day Post-Op    Reason for Admission: The patient was admitted for the following: Procedure(s):  LEFT DIRECT ANTERIOR TOTAL HIP ARTHROPLASTY    BRIEF HOSPITAL COURSE   Felecia Diana is a pleasant 78 year old female who underwent the aforementioned procedure with Dr. Alexander on 2023. There were no intraoperative complications and the patient was transferred to the recovery room and later the orthopedic unit in stable condition. Once the patient reached the orthopedic floor our orthopedic pain protocol was implemented along with the following:    Anticoagulation Medications: ASA  Therapy: PT and OT  Activity: WBAT  Bracing: None    Consultations during Admission: Hospitalist service for medical management     COMPLICATIONS/SIGNIFICANT FINDINGS    none    DISCHARGE INFORMATION   Condition at discharge: Good  Discharge destination: Home  Patient was seen by myself on the date of discharge.    FOLLOW UP CARE   Follow up with orthopedics in 2 weeks or sooner should the need arise. Ortho will continue to manage pain control, post op anticoagulation and incision care.     Follow up with your PCP for management of chronic medical problems and to evaluate for post op medical complications including constipation, nausea/vomiting, DVT/PE, anemia, changes in blood pressure, fevers/chills, urinary retention and atelectasis/pneumonia.     Subjective   Pain: Well controlled on Tylenol and Oxycodone  Nausea, Vomiting:  No  Chest pain: No  Lightheadedness, Dizziness:  No  Neuro:  Patient denies new onset numbness or paresthesias     Patient doing well on POD #1. Pain is controlled with oral medications. States her thigh is sore. Ambulating well and  "cleared by therapies. Tolerating oral intake without nausea or vomiting. Voiding & passing gas.      Hgb 11.0 (13.0 pre-op)    Physical Exam   BP 97/49 (BP Location: Right arm)   Pulse 57   Temp 98.9  F (37.2  C) (Oral)   Resp 16   Ht 1.448 m (4' 9\")   Wt 85.3 kg (188 lb)   SpO2 92%   BMI 40.68 kg/m    The patient is A&Ox3. Appears comfortable, sitting up at bedside.  Calves are soft and non-tender bilaterally  Brisk capillary refill in the toes.   Palpable Left dorsalis pedis pulse. Foot warm & well-perfused.  Sensation is intact to light touch & equal bilaterally in the femoral, DP, SP & tibial nerve distributions.  ROM: Flexes at Left hip. Appropriately flexes & extends all toes bilaterally.   Motor: +5/5 dorsiflexion, plantar flexion & EHL bilaterally. Fires quad.   Leg lengths equal.  Left anterior hip dressing C/D/I without strikethrough, no surrounding erythema.   5/5 TA/GSC/EHL       Pertinent Results at Discharge     Hemoglobin   Date/Time Value Ref Range Status   12/21/2023 05:49 AM 11.0 (L) 11.7 - 15.7 g/dL Final   11/28/2023 09:59 AM 13.0 11.7 - 15.7 g/dL Final   12/20/2022 08:16 PM 13.6 11.7 - 15.7 g/dL Final     Platelet Count   Date/Time Value Ref Range Status   11/28/2023 09:59  150 - 450 10e3/uL Final   12/20/2022 08:16  150 - 450 10e3/uL Final       Problem List   Principal Problem:    Orthopedic aftercare for joint replacement      Jeannie Way PA-C/Dr. Alexander  [facility]  [phone]  Date: 12/21/2023  Time: 10:29 AM    "

## 2023-12-21 NOTE — PLAN OF CARE
Pt ready for discharge. All belongings sent with patient. Discharge instructions reviewed with pt and all questions answered. Transport home w/daughter.

## 2023-12-21 NOTE — CONSULTS
Care Management Discharge Note    Discharge Date: 12/21/2023       Discharge Disposition: Home    Discharge Services: None    Discharge DME: None    Discharge Transportation:      Private pay costs discussed: Not applicable    Education Provided on the Discharge Plan: Yes (AVS per bedside RN)    Persons Notified of Discharge Plans: patient    Patient/Family in Agreement with the Plan: yes    Handoff Referral Completed: Yes    Additional Information:    Pt discharging to home via friend/family transport, OP followup.  No CM needs identified.    Blossom Marc RN

## 2023-12-21 NOTE — PLAN OF CARE
Goal Outcome Evaluation:    Patient vital signs are at baseline: Yes  Patient able to ambulate as they were prior to admission or with assist devices provided by therapies during their stay:  Yes  Patient MUST void prior to discharge:  Yes  Patient able to tolerate oral intake:  Yes  Pain has adequate pain control using Oral analgesics:  Yes  Does patient have an identified :  Yes  Has goal D/C date and time been discussed with patient:  Yes    VSS. PRN oxy given for L hip and thigh pain. Ambulating A1 with gb and walker. Voiding adequately. Discharge pending PT/OT.

## 2023-12-21 NOTE — PROGRESS NOTES
12/21/23 0732   Appointment Info   Signing Clinician's Name / Credentials (OT) ANGELICA Childers/L, CLT   Rehab Comments (OT) OT agustin   Quick Adds   Quick Adds Certification   Living Environment   People in Home alone   Current Living Arrangements apartment   Home Accessibility no concerns   Self-Care   Usual Activity Tolerance good   Current Activity Tolerance moderate   Equipment Currently Used at Home grab bar, tub/shower;cane, straight   Fall history within last six months no   Activity/Exercise/Self-Care Comment Pt IND w/ ADLs and IADLs at baseline   General Information   Onset of Illness/Injury or Date of Surgery 12/20/23   Referring Physician Dr. Alexander   Patient/Family Therapy Goal Statement (OT) To go home   Additional Occupational Profile Info/Pertinent History of Current Problem s/p GIOVANA   Existing Precautions/Restrictions weight bearing;no known precautions/restrictions   Cognitive Status Examination   Orientation Status orientation to person, place and time   Visual Perception   Visual Impairment/Limitations WFL   Sensory   Sensory Quick Adds sensation intact   Pain Assessment   Patient Currently in Pain No   Posture   Posture not impaired   Range of Motion Comprehensive   General Range of Motion no range of motion deficits identified   Strength Comprehensive (MMT)   General Manual Muscle Testing (MMT) Assessment no strength deficits identified   Muscle Tone Assessment   Muscle Tone Quick Adds No deficits were identified   Coordination   Upper Extremity Coordination No deficits were identified   Bed Mobility   Bed Mobility supine-sit;sit-supine   Comment (Bed Mobility) SBA-CGA   Transfers   Transfers bed-chair transfer;sit-stand transfer;toilet transfer;shower transfer   Transfer Comments SBA-CGA   Transfer Skill: Bed to Chair/Chair to Bed   Transfer Comments CGA   Sit-Stand Transfer   Sit/Stand Transfer Comments CGA   Shower Transfer   Shower Transfer Comments CGA   Toilet Transfer   Toilet  Transfer Comments CGA   Balance   Balance Comments decreased   Activities of Daily Living   BADL Assessment/Intervention lower body dressing;toileting;bathing   Bathing Assessment/Intervention   [place] Level (Bathing) lower body;minimum assist (75% patient effort)   Lower Body Dressing Assessment/Training   [place] Level (Lower Body Dressing) lower body dressing skills;maximum assist (25% patient effort)   Toileting   [place] Level (Toileting) adjust/manage clothing;supervision   Clinical Impression   Criteria for Skilled Therapeutic Interventions Met (OT) Yes, treatment indicated   OT Diagnosis decreased ADL   Influenced by the following impairments GIOVANA   OT Problem List-Impairments impacting ADL activity tolerance impaired;mobility;pain;post-surgical precautions;flexibility;balance   Assessment of Occupational Performance 3-5 Performance Deficits   Identified Performance Deficits LE dressing/bathing, bed mobility, all transfers, toileting   Planned Therapy Interventions (OT) ADL retraining;bed mobility training;transfer training   Clinical Decision Making Complexity (OT) detailed assessment/moderate complexity   Risk & Benefits of therapy have been explained evaluation/treatment results reviewed;patient   OT Total Evaluation Time   OT Eval, Moderate Complexity Minutes (82601) 10   Therapy Certification   Medical Diagnosis GIOVANA   Start of Care Date 12/21/23   Certification date from 12/21/23   Certification date to 01/20/24   OT Goals   Therapy Frequency (OT) One time eval and treatment   OT Predicted Duration/Target Date for Goal Attainment 12/21/23   OT Goals Lower Body Dressing;Bed Mobility;Toilet Transfer/Toileting   OT: Lower Body Dressing Modified independent;within precautions   OT: Bed Mobility Modified independent;supine to/from sitting;rolling   OT: Toilet Transfer/Toileting Modified independent;toilet transfer;cleaning and garment management   Interventions   Interventions Quick Adds  Self-Care/Home Management   Self-Care/Home Management   Self-Care/Home Mgmt/ADL, Compensatory, Meal Prep Minutes (87015) 37   Symptoms Noted During/After Treatment (Meal Preparation/Planning Training) none   Treatment Detail/Skilled Intervention Pt edu on hip px - demonstrated ability to complete ADLs w/in px(or lack thereof). Pt edu on compensatory strategies for LE dressing using reacher, shoe horn, and sock aid - completed Mod I w/ AE. STS w/ SBA and FWW. Pt amb. 15 ft to BR w/ FWW Mod I. toileting with mod I. Edu on toilet/walkin shower transfer w/ grab bars - completed each Mod I. Pt instructed on bed mobility techniques - completed Mod I. Pt edu on sleeping position and car transfers - pt verbalized understanding.   OT Discharge Planning   OT Plan OT d/c   OT Discharge Recommendation (DC Rec) other (see comments)   OT Rationale for DC Rec pt has good support from dtr and son   OT Brief overview of current status mod I for BADL   OT Equipment Needed at Discharge   (sock aide/reacher)   Total Session Time   Timed Code Treatment Minutes 37   Total Session Time (sum of timed and untimed services) 47    [facility]  OUTPATIENT OCCUPATIONAL THERAPY  EVALUATION  PLAN OF TREATMENT FOR OUTPATIENT REHABILITATION  (COMPLETE FOR INITIAL CLAIMS ONLY)  Patient's Last Name, First Name, M.I.  YOB: 1945  Felecia Diana                          Provider's Name  [facility] Medical Record No.  [id number]                             Onset Date:  12/20/23   Start of Care Date:  12/21/23   Type:     ___PT   _X_OT   ___SLP Medical Diagnosis:  GIOVANA                    OT Diagnosis:  decreased ADL Visits from SOC:  1     See note for plan of treatment, functional goals and certification details    I CERTIFY THE NEED FOR THESE SERVICES FURNISHED UNDER        THIS PLAN OF TREATMENT AND WHILE UNDER MY CARE     (Physician co-signature of this document indicates  review and certification of the therapy plan).
